# Patient Record
Sex: FEMALE | Race: OTHER | Employment: UNEMPLOYED | URBAN - METROPOLITAN AREA
[De-identification: names, ages, dates, MRNs, and addresses within clinical notes are randomized per-mention and may not be internally consistent; named-entity substitution may affect disease eponyms.]

---

## 2019-10-16 ENCOUNTER — IMMUNIZATION (OUTPATIENT)
Dept: FAMILY MEDICINE CLINIC | Facility: CLINIC | Age: 24
End: 2019-10-16
Payer: MEDICAID

## 2019-10-16 DIAGNOSIS — Z23 NEED FOR VACCINATION: ICD-10-CM

## 2019-10-16 PROCEDURE — 90686 IIV4 VACC NO PRSV 0.5 ML IM: CPT | Performed by: NURSE PRACTITIONER

## 2019-10-16 PROCEDURE — 90471 IMMUNIZATION ADMIN: CPT | Performed by: NURSE PRACTITIONER

## 2020-02-03 ENCOUNTER — HOSPITAL ENCOUNTER (EMERGENCY)
Facility: HOSPITAL | Age: 25
Discharge: HOME | End: 2020-02-03
Attending: EMERGENCY MEDICINE
Payer: MEDICAID

## 2020-02-03 ENCOUNTER — APPOINTMENT (OUTPATIENT)
Dept: ULTRASOUND IMAGING | Facility: HOSPITAL | Age: 25
End: 2020-02-03
Attending: EMERGENCY MEDICINE
Payer: MEDICAID

## 2020-02-03 VITALS
HEIGHT: 64 IN | HEART RATE: 78 BPM | TEMPERATURE: 98 F | BODY MASS INDEX: 34.15 KG/M2 | DIASTOLIC BLOOD PRESSURE: 66 MMHG | WEIGHT: 200 LBS | RESPIRATION RATE: 18 BRPM | OXYGEN SATURATION: 99 % | SYSTOLIC BLOOD PRESSURE: 130 MMHG

## 2020-02-03 DIAGNOSIS — O26.891 ABDOMINAL PAIN DURING PREGNANCY IN FIRST TRIMESTER: Primary | ICD-10-CM

## 2020-02-03 DIAGNOSIS — R10.9 ABDOMINAL PAIN DURING PREGNANCY IN FIRST TRIMESTER: Primary | ICD-10-CM

## 2020-02-03 LAB
ANION GAP SERPL CALC-SCNC: 8 MMOL/L (ref 0–18)
ANTIBODY SCREEN: NEGATIVE
B-HCG SERPL-ACNC: 2397 MIU/ML
B-HCG UR QL: POSITIVE
BACTERIA UR QL AUTO: NEGATIVE /HPF
BASOPHILS # BLD AUTO: 0.04 X10(3) UL (ref 0–0.2)
BASOPHILS NFR BLD AUTO: 0.4 %
BILIRUB UR QL: NEGATIVE
BUN BLD-MCNC: 7 MG/DL (ref 7–18)
BUN/CREAT SERPL: 13 (ref 10–20)
CALCIUM BLD-MCNC: 8.4 MG/DL (ref 8.5–10.1)
CHLORIDE SERPL-SCNC: 108 MMOL/L (ref 98–112)
CLARITY UR: CLEAR
CO2 SERPL-SCNC: 23 MMOL/L (ref 21–32)
COLOR UR: YELLOW
CREAT BLD-MCNC: 0.54 MG/DL (ref 0.55–1.02)
DEPRECATED RDW RBC AUTO: 42.6 FL (ref 35.1–46.3)
EOSINOPHIL # BLD AUTO: 0.28 X10(3) UL (ref 0–0.7)
EOSINOPHIL NFR BLD AUTO: 2.6 %
ERYTHROCYTE [DISTWIDTH] IN BLOOD BY AUTOMATED COUNT: 13 % (ref 11–15)
GLUCOSE BLD-MCNC: 105 MG/DL (ref 70–99)
GLUCOSE UR-MCNC: NEGATIVE MG/DL
HCT VFR BLD AUTO: 37.9 % (ref 35–48)
HGB BLD-MCNC: 12.6 G/DL (ref 12–16)
HGB UR QL STRIP.AUTO: NEGATIVE
IMM GRANULOCYTES # BLD AUTO: 0.04 X10(3) UL (ref 0–1)
IMM GRANULOCYTES NFR BLD: 0.4 %
KETONES UR-MCNC: 20 MG/DL
LYMPHOCYTES # BLD AUTO: 3.15 X10(3) UL (ref 1–4)
LYMPHOCYTES NFR BLD AUTO: 29.6 %
MCH RBC QN AUTO: 29.7 PG (ref 26–34)
MCHC RBC AUTO-ENTMCNC: 33.2 G/DL (ref 31–37)
MCV RBC AUTO: 89.4 FL (ref 80–100)
MONOCYTES # BLD AUTO: 0.93 X10(3) UL (ref 0.1–1)
MONOCYTES NFR BLD AUTO: 8.7 %
NEUTROPHILS # BLD AUTO: 6.19 X10 (3) UL (ref 1.5–7.7)
NEUTROPHILS # BLD AUTO: 6.19 X10(3) UL (ref 1.5–7.7)
NEUTROPHILS NFR BLD AUTO: 58.3 %
NITRITE UR QL STRIP.AUTO: NEGATIVE
OSMOLALITY SERPL CALC.SUM OF ELEC: 286 MOSM/KG (ref 275–295)
PH UR: 5 [PH] (ref 5–8)
PLATELET # BLD AUTO: 308 10(3)UL (ref 150–450)
POTASSIUM SERPL-SCNC: 4 MMOL/L (ref 3.5–5.1)
PROT UR-MCNC: NEGATIVE MG/DL
RBC # BLD AUTO: 4.24 X10(6)UL (ref 3.8–5.3)
RBC #/AREA URNS AUTO: 1 /HPF
RH BLOOD TYPE: POSITIVE
SODIUM SERPL-SCNC: 139 MMOL/L (ref 136–145)
SP GR UR STRIP: 1.02 (ref 1–1.03)
UROBILINOGEN UR STRIP-ACNC: <2
WBC # BLD AUTO: 10.6 X10(3) UL (ref 4–11)
WBC #/AREA URNS AUTO: 4 /HPF

## 2020-02-03 PROCEDURE — 85025 COMPLETE CBC W/AUTO DIFF WBC: CPT | Performed by: EMERGENCY MEDICINE

## 2020-02-03 PROCEDURE — 99285 EMERGENCY DEPT VISIT HI MDM: CPT

## 2020-02-03 PROCEDURE — 81001 URINALYSIS AUTO W/SCOPE: CPT | Performed by: EMERGENCY MEDICINE

## 2020-02-03 PROCEDURE — 84702 CHORIONIC GONADOTROPIN TEST: CPT | Performed by: EMERGENCY MEDICINE

## 2020-02-03 PROCEDURE — 81025 URINE PREGNANCY TEST: CPT

## 2020-02-03 PROCEDURE — 86850 RBC ANTIBODY SCREEN: CPT | Performed by: EMERGENCY MEDICINE

## 2020-02-03 PROCEDURE — 76817 TRANSVAGINAL US OBSTETRIC: CPT | Performed by: EMERGENCY MEDICINE

## 2020-02-03 PROCEDURE — 86900 BLOOD TYPING SEROLOGIC ABO: CPT | Performed by: EMERGENCY MEDICINE

## 2020-02-03 PROCEDURE — 86901 BLOOD TYPING SEROLOGIC RH(D): CPT | Performed by: EMERGENCY MEDICINE

## 2020-02-03 PROCEDURE — 36415 COLL VENOUS BLD VENIPUNCTURE: CPT

## 2020-02-03 PROCEDURE — 80048 BASIC METABOLIC PNL TOTAL CA: CPT | Performed by: EMERGENCY MEDICINE

## 2020-02-03 PROCEDURE — 76801 OB US < 14 WKS SINGLE FETUS: CPT | Performed by: EMERGENCY MEDICINE

## 2020-02-04 NOTE — ED PROVIDER NOTES
Patient Seen in: [facility] AND [facility] Emergency Department    History   Patient presents with:  Abdomen/Flank Pain      HPI    Patient presents to the ED complaining of low abdominal pain for the past week. Progressive and now moderate in severity.   Locate Tenderness to the left lower quadrant, no rebound or guarding Musculoskeletal:         General: No deformity or edema. Neurological: She is alert and oriented to person, place, and time. Skin: Skin is warm and dry.    Psychiatric: She has a normal m 82 Charo Monterroso (BLOOD [facility] [place] [facility])[[id number]]                               Final result                               ANTIBODY SCREEN[[id number]]                                  Final result                                                 Pl Temp: 98.1 °F (36.7 °C)     TempSrc: Temporal     SpO2: 99% 99% 99%   Weight: 90.7 kg     Height: 162.6 cm (5' 4\")       *I personally reviewed and interpreted all ED vitals.     Pulse Ox: 99%, Room air, Normal     Differential Diagnosis/ Diagnostic Cons

## 2020-02-04 NOTE — ED INITIAL ASSESSMENT (HPI)
Pt ambulatory to triage for complaints of lower abdominal pain with nausea that has been going x 1 week. Pt denies vomiting, denies fever, denies vaginal bleeding. Pt was at Urgent care today and has POSITIVE PREGNANCY TEST. LMP: December 20,2019.   Per

## 2020-02-11 ENCOUNTER — OFFICE VISIT (OUTPATIENT)
Dept: OBGYN CLINIC | Facility: CLINIC | Age: 25
End: 2020-02-11
Payer: MEDICAID

## 2020-02-11 ENCOUNTER — TELEPHONE (OUTPATIENT)
Dept: OBGYN CLINIC | Facility: CLINIC | Age: 25
End: 2020-02-11

## 2020-02-11 VITALS
HEIGHT: 64 IN | BODY MASS INDEX: 35.96 KG/M2 | DIASTOLIC BLOOD PRESSURE: 70 MMHG | SYSTOLIC BLOOD PRESSURE: 112 MMHG | WEIGHT: 210.63 LBS

## 2020-02-11 DIAGNOSIS — N92.6 MISSED MENSES: Primary | ICD-10-CM

## 2020-02-11 LAB
CONTROL LINE PRESENT WITH A CLEAR BACKGROUND (YES/NO): YES YES/NO
KIT LOT #: NORMAL NUMERIC

## 2020-02-11 PROCEDURE — 81025 URINE PREGNANCY TEST: CPT | Performed by: OBSTETRICS & GYNECOLOGY

## 2020-02-11 PROCEDURE — 99203 OFFICE O/P NEW LOW 30 MIN: CPT | Performed by: OBSTETRICS & GYNECOLOGY

## 2020-02-11 RX ORDER — VITAMIN A ACETATE, BETA CAROTENE, ASCORBIC ACID, CHOLECALCIFEROL, .ALPHA.-TOCOPHEROL ACETATE, DL-, THIAMINE MONONITRATE, RIBOFLAVIN, NIACINAMIDE, PYRIDOXINE HYDROCHLORIDE, FOLIC ACID, CYANOCOBALAMIN, CALCIUM CARBONATE, FERROUS FUMARATE, ZINC OXIDE, CUPRIC OXIDE 3080; 12; 120; 400; 1; 1.84; 3; 20; 22; 920; 25; 200; 27; 10; 2 [IU]/1; UG/1; MG/1; [IU]/1; MG/1; MG/1; MG/1; MG/1; MG/1; [IU]/1; MG/1; MG/1; MG/1; MG/1; MG/1
1 TABLET, FILM COATED ORAL DAILY
Qty: 90 TABLET | Refills: 3 | Status: SHIPPED | OUTPATIENT
Start: 2020-02-11 | End: 2020-03-12

## 2020-02-11 RX ORDER — PNV 119/IRON FUM/FOLIC ACID 29 MG-1 MG
1 TABLET ORAL DAILY
Qty: 30 TABLET | Refills: 11 | Status: SHIPPED | OUTPATIENT
Start: 2020-02-11

## 2020-02-11 NOTE — PROGRESS NOTES
HPI:    Patient ID: Onesimo Painter is a 27year old female. HPI  New patient  Missed menses visit  I delivered this patient 22 years ago! She is a  2 para 1 with a last menstrual period of  who conceived on Depo-Provera.   She had her last

## 2020-02-11 NOTE — TELEPHONE ENCOUNTER
Emirati SPEAKER- Pt would like to know if Dr. Rosalio Perkins is going to prescribe prenatal vitamins.  Please advise

## 2020-02-20 ENCOUNTER — OFFICE VISIT (OUTPATIENT)
Dept: OBGYN CLINIC | Facility: CLINIC | Age: 25
End: 2020-02-20
Payer: MEDICAID

## 2020-02-20 VITALS
SYSTOLIC BLOOD PRESSURE: 114 MMHG | BODY MASS INDEX: 35.85 KG/M2 | DIASTOLIC BLOOD PRESSURE: 70 MMHG | HEIGHT: 64 IN | WEIGHT: 210 LBS

## 2020-02-20 DIAGNOSIS — N92.6 MISSED MENSES: Primary | ICD-10-CM

## 2020-02-20 PROCEDURE — 99212 OFFICE O/P EST SF 10 MIN: CPT | Performed by: OBSTETRICS & GYNECOLOGY

## 2020-02-20 PROCEDURE — 76817 TRANSVAGINAL US OBSTETRIC: CPT | Performed by: OBSTETRICS & GYNECOLOGY

## 2020-02-20 NOTE — PROGRESS NOTES
HPI:    Patient ID: Ramone Rosales is a 27year old female. HPI  Follow-up visit  Ultrasound for dating done transvaginally showing a single live intrauterine gestation at 7-2/7 weeks gestational age. EDC of October 6, 2020.   Patient states that she was [facility]

## 2020-03-06 ENCOUNTER — TELEPHONE (OUTPATIENT)
Dept: OBGYN CLINIC | Facility: CLINIC | Age: 25
End: 2020-03-06

## 2020-03-06 NOTE — TELEPHONE ENCOUNTER
Pt Name and  verified. Pt states that she needs script to sent again to pharmacy as her insurance was no covering the PNV. Advised pt that script was sent again for a similar PNV and she needs to check with the Pharmacy to see if this one is covered.

## 2020-03-17 ENCOUNTER — NURSE ONLY (OUTPATIENT)
Dept: OBGYN CLINIC | Facility: CLINIC | Age: 25
End: 2020-03-17
Payer: MEDICAID

## 2020-03-17 ENCOUNTER — TELEPHONE (OUTPATIENT)
Dept: OBGYN CLINIC | Facility: CLINIC | Age: 25
End: 2020-03-17

## 2020-03-17 DIAGNOSIS — Z34.81 ENCOUNTER FOR SUPERVISION OF OTHER NORMAL PREGNANCY IN FIRST TRIMESTER: Primary | ICD-10-CM

## 2020-03-17 DIAGNOSIS — O24.011 PRE-EXISTING TYPE 1 DIABETES MELLITUS DURING PREGNANCY IN FIRST TRIMESTER: Primary | ICD-10-CM

## 2020-03-17 DIAGNOSIS — O24.011 PRE-EXISTING TYPE 1 DIABETES MELLITUS DURING PREGNANCY IN FIRST TRIMESTER: ICD-10-CM

## 2020-03-17 PROCEDURE — 99211 OFF/OP EST MAY X REQ PHY/QHP: CPT | Performed by: OBSTETRICS & GYNECOLOGY

## 2020-03-17 NOTE — TELEPHONE ENCOUNTER
Had over the phone Ob Nurse education with patient. Currently 11 weeks gestation, was Dx with diabetes 01/2020 and currently on Metformin, Checking blood sugars BID. Hasn't seen diabetes center.  Ok to send referral for diabetes nutrition and glucose anam

## 2020-03-17 NOTE — PROGRESS NOTES
Pt is here today for RN [employer] Education.     Missed menses apt with: ROWAN  LMP:2019    Pre  BMI: 33.97  EPDS score: 1    US: Office U/S   Working YESI: 10/06/2020  Hx of genetic abnormality in family: None  Hx of varicella: unknown  Hx of diabet

## 2020-03-25 ENCOUNTER — LAB ENCOUNTER (OUTPATIENT)
Dept: LAB | Facility: HOSPITAL | Age: 25
End: 2020-03-25
Attending: OBSTETRICS & GYNECOLOGY
Payer: MEDICAID

## 2020-03-25 ENCOUNTER — TELEPHONE (OUTPATIENT)
Dept: OBGYN CLINIC | Facility: CLINIC | Age: 25
End: 2020-03-25

## 2020-03-25 DIAGNOSIS — Z86.32 HISTORY OF GESTATIONAL DIABETES: ICD-10-CM

## 2020-03-25 DIAGNOSIS — Z86.32 HISTORY OF GESTATIONAL DIABETES: Primary | ICD-10-CM

## 2020-03-25 DIAGNOSIS — Z34.81 ENCOUNTER FOR SUPERVISION OF OTHER NORMAL PREGNANCY IN FIRST TRIMESTER: ICD-10-CM

## 2020-03-25 DIAGNOSIS — O24.011 PRE-EXISTING TYPE 1 DIABETES MELLITUS DURING PREGNANCY IN FIRST TRIMESTER: ICD-10-CM

## 2020-03-25 LAB
ALBUMIN SERPL-MCNC: 3.3 G/DL (ref 3.4–5)
ALBUMIN/GLOB SERPL: 0.8 {RATIO} (ref 1–2)
ALP LIVER SERPL-CCNC: 58 U/L (ref 52–144)
ALT SERPL-CCNC: 16 U/L (ref 13–56)
ANION GAP SERPL CALC-SCNC: 7 MMOL/L (ref 0–18)
ANTIBODY SCREEN: NEGATIVE
AST SERPL-CCNC: 14 U/L (ref 15–37)
BASOPHILS # BLD AUTO: 0.05 X10(3) UL (ref 0–0.2)
BASOPHILS NFR BLD AUTO: 0.4 %
BILIRUB SERPL-MCNC: 0.2 MG/DL (ref 0.1–2)
BUN BLD-MCNC: 7 MG/DL (ref 7–18)
BUN/CREAT SERPL: 15.9 (ref 10–20)
CALCIUM BLD-MCNC: 9.2 MG/DL (ref 8.5–10.1)
CHLORIDE SERPL-SCNC: 107 MMOL/L (ref 98–112)
CO2 SERPL-SCNC: 23 MMOL/L (ref 21–32)
CREAT BLD-MCNC: 0.44 MG/DL (ref 0.55–1.02)
DEPRECATED RDW RBC AUTO: 42.4 FL (ref 35.1–46.3)
EOSINOPHIL # BLD AUTO: 0.27 X10(3) UL (ref 0–0.7)
EOSINOPHIL NFR BLD AUTO: 2.4 %
ERYTHROCYTE [DISTWIDTH] IN BLOOD BY AUTOMATED COUNT: 13.1 % (ref 11–15)
EST. AVERAGE GLUCOSE BLD GHB EST-MCNC: 126 MG/DL (ref 68–126)
GLOBULIN PLAS-MCNC: 3.9 G/DL (ref 2.8–4.4)
GLUCOSE 1H P GLC SERPL-MCNC: 151 MG/DL
GLUCOSE BLD-MCNC: 95 MG/DL (ref 70–99)
HBA1C MFR BLD HPLC: 6 % (ref ?–5.7)
HBV SURFACE AG SER-ACNC: 0.1 [IU]/L
HBV SURFACE AG SERPL QL IA: NONREACTIVE
HCT VFR BLD AUTO: 39.8 % (ref 35–48)
HGB BLD-MCNC: 13.4 G/DL (ref 12–16)
IMM GRANULOCYTES # BLD AUTO: 0.08 X10(3) UL (ref 0–1)
IMM GRANULOCYTES NFR BLD: 0.7 %
LYMPHOCYTES # BLD AUTO: 2.53 X10(3) UL (ref 1–4)
LYMPHOCYTES NFR BLD AUTO: 22.1 %
M PROTEIN MFR SERPL ELPH: 7.2 G/DL (ref 6.4–8.2)
MCH RBC QN AUTO: 29.8 PG (ref 26–34)
MCHC RBC AUTO-ENTMCNC: 33.7 G/DL (ref 31–37)
MCV RBC AUTO: 88.6 FL (ref 80–100)
MONOCYTES # BLD AUTO: 0.88 X10(3) UL (ref 0.1–1)
MONOCYTES NFR BLD AUTO: 7.7 %
NEUTROPHILS # BLD AUTO: 7.66 X10 (3) UL (ref 1.5–7.7)
NEUTROPHILS # BLD AUTO: 7.66 X10(3) UL (ref 1.5–7.7)
NEUTROPHILS NFR BLD AUTO: 66.7 %
OSMOLALITY SERPL CALC.SUM OF ELEC: 282 MOSM/KG (ref 275–295)
PATIENT FASTING Y/N/NP: NO
PLATELET # BLD AUTO: 301 10(3)UL (ref 150–450)
POTASSIUM SERPL-SCNC: 4 MMOL/L (ref 3.5–5.1)
RBC # BLD AUTO: 4.49 X10(6)UL (ref 3.8–5.3)
RUBV IGG SER QL: POSITIVE
RUBV IGG SER-ACNC: 130 IU/ML (ref 10–?)
SODIUM SERPL-SCNC: 137 MMOL/L (ref 136–145)
WBC # BLD AUTO: 11.5 X10(3) UL (ref 4–11)

## 2020-03-25 PROCEDURE — 83036 HEMOGLOBIN GLYCOSYLATED A1C: CPT

## 2020-03-25 PROCEDURE — 82950 GLUCOSE TEST: CPT

## 2020-03-25 PROCEDURE — 87086 URINE CULTURE/COLONY COUNT: CPT

## 2020-03-25 PROCEDURE — 86780 TREPONEMA PALLIDUM: CPT

## 2020-03-25 PROCEDURE — 85025 COMPLETE CBC W/AUTO DIFF WBC: CPT

## 2020-03-25 PROCEDURE — 36415 COLL VENOUS BLD VENIPUNCTURE: CPT

## 2020-03-25 PROCEDURE — 87340 HEPATITIS B SURFACE AG IA: CPT

## 2020-03-25 PROCEDURE — 87389 HIV-1 AG W/HIV-1&-2 AB AG IA: CPT

## 2020-03-25 PROCEDURE — 86762 RUBELLA ANTIBODY: CPT

## 2020-03-25 PROCEDURE — 80053 COMPREHEN METABOLIC PANEL: CPT

## 2020-03-25 PROCEDURE — 86850 RBC ANTIBODY SCREEN: CPT

## 2020-03-26 ENCOUNTER — TELEPHONE (OUTPATIENT)
Dept: OBGYN CLINIC | Facility: CLINIC | Age: 25
End: 2020-03-26

## 2020-03-26 DIAGNOSIS — R73.09 ELEVATED GLUCOSE TOLERANCE TEST: Primary | ICD-10-CM

## 2020-03-26 PROBLEM — E11.9 TYPE 2 DIABETES MELLITUS WITHOUT COMPLICATION (HCC): Status: ACTIVE | Noted: 2020-03-26

## 2020-03-26 NOTE — TELEPHONE ENCOUNTER
03/26/2020 Pt was informed about her glucose and A1C results. Pt verbalized understanding. Pt did not have any further questions. Diabetes Center order has been placed in [place].

## 2020-03-26 NOTE — TELEPHONE ENCOUNTER
----- Message from Autumn Thomas MD sent at 3/26/2020  9:04 AM CDT -----  Please inform Glucola and Hgb A1C elevated. Has diagnosis of diabetes already. Will defer 3 hr GTT.   Patient to go to Diabetes center for dietary counseling and glucose monitori

## 2020-03-27 ENCOUNTER — INITIAL PRENATAL (OUTPATIENT)
Dept: OBGYN CLINIC | Facility: CLINIC | Age: 25
End: 2020-03-27
Payer: MEDICAID

## 2020-03-27 VITALS — DIASTOLIC BLOOD PRESSURE: 74 MMHG | WEIGHT: 201 LBS | SYSTOLIC BLOOD PRESSURE: 122 MMHG | BODY MASS INDEX: 35 KG/M2

## 2020-03-27 DIAGNOSIS — O24.111 PRE-EXISTING TYPE 2 DIABETES MELLITUS DURING PREGNANCY IN FIRST TRIMESTER: ICD-10-CM

## 2020-03-27 DIAGNOSIS — Z34.91 NORMAL PREGNANCY IN FIRST TRIMESTER: Primary | ICD-10-CM

## 2020-03-27 PROBLEM — O99.210 OBESITY AFFECTING PREGNANCY, ANTEPARTUM: Status: ACTIVE | Noted: 2020-03-27

## 2020-03-27 PROBLEM — N92.6 MISSED MENSES: Status: RESOLVED | Noted: 2020-02-11 | Resolved: 2020-03-27

## 2020-03-27 PROBLEM — O99.210 OBESITY AFFECTING PREGNANCY, ANTEPARTUM (HCC): Status: ACTIVE | Noted: 2020-03-27

## 2020-03-27 LAB
APPEARANCE: CLEAR
MULTISTIX LOT#: NORMAL NUMERIC
PH, URINE: 6 (ref 4.5–8)
SPECIFIC GRAVITY: 1.02 (ref 1–1.03)
T PALLIDUM AB SER QL: NEGATIVE
UROBILINOGEN,SEMI-QN: 0.2 MG/DL (ref 0–1.9)

## 2020-03-27 PROCEDURE — 81002 URINALYSIS NONAUTO W/O SCOPE: CPT | Performed by: OBSTETRICS & GYNECOLOGY

## 2020-03-27 PROCEDURE — 99213 OFFICE O/P EST LOW 20 MIN: CPT | Performed by: OBSTETRICS & GYNECOLOGY

## 2020-03-27 NOTE — PROGRESS NOTES
No c/o. Has not done genetic screen  Order FTS and MFM cons for DM  Has DM center appt for tomorrow.

## 2020-03-28 ENCOUNTER — HOSPITAL ENCOUNTER (OUTPATIENT)
Dept: ENDOCRINOLOGY | Facility: HOSPITAL | Age: 25
Discharge: HOME | End: 2020-03-28
Attending: OBSTETRICS & GYNECOLOGY
Payer: MEDICAID

## 2020-03-28 VITALS — WEIGHT: 210 LBS | BODY MASS INDEX: 36 KG/M2

## 2020-03-28 DIAGNOSIS — O24.112 TYPE 2 DIABETES MELLITUS IN PREGNANCY, SECOND TRIMESTER: Primary | ICD-10-CM

## 2020-03-28 RX ORDER — LANCETS 33 GAUGE
1 EACH MISCELLANEOUS 4 TIMES DAILY
Qty: 1 BOX | Refills: 2 | Status: SHIPPED | OUTPATIENT
Start: 2020-03-28 | End: 2021-05-22

## 2020-03-28 NOTE — TELEPHONE ENCOUNTER
Received fax from Anthoyn Hay about prior authorization for pt's BG strips for her ONeTouch Ultra meter. Called and spoke to pharmacist who states that she was able to put through insurance without a prior auth for pt to do 3x per day testing.   Should was t

## 2020-03-30 ENCOUNTER — TELEPHONE (OUTPATIENT)
Dept: OBGYN CLINIC | Facility: CLINIC | Age: 25
End: 2020-03-30

## 2020-03-30 LAB
C TRACH DNA SPEC QL NAA+PROBE: NEGATIVE
N GONORRHOEA DNA SPEC QL NAA+PROBE: NEGATIVE
PAP HISTORY (OTHER THAN LAST PAP): NORMAL

## 2020-03-30 NOTE — TELEPHONE ENCOUNTER
Your case has been sent to Medical Review.      Insurance Carrier: [facility]  Site Name: [facility] Site ID: [id number]        Primary Diagnosis Code: L96.732 Description: Pre-existing type 2 diabetes mellitus, in pregnancy, first trimester  [facility]

## 2020-03-31 NOTE — TELEPHONE ENCOUNTER
Request ID: [id number]  Physician: DR. Marty Mcguire  Facility/Provider: [facility]  Treatment Setting: Outpatient  Approved  [id number]   [id number]   Begin Date: End Date:  3/30/2020 12/26/2020

## 2020-04-06 ENCOUNTER — TELEPHONE (OUTPATIENT)
Dept: ENDOCRINOLOGY | Facility: HOSPITAL | Age: 25
End: 2020-04-06

## 2020-04-06 ENCOUNTER — HOSPITAL ENCOUNTER (OUTPATIENT)
Dept: PERINATAL CARE | Facility: HOSPITAL | Age: 25
Discharge: HOME | End: 2020-04-06
Attending: OBSTETRICS & GYNECOLOGY
Payer: MEDICAID

## 2020-04-06 ENCOUNTER — TELEPHONE (OUTPATIENT)
Dept: OBGYN CLINIC | Facility: CLINIC | Age: 25
End: 2020-04-06

## 2020-04-06 DIAGNOSIS — O99.210 OBESITY AFFECTING PREGNANCY, ANTEPARTUM: ICD-10-CM

## 2020-04-06 DIAGNOSIS — O24.112 TYPE 2 DIABETES MELLITUS IN PREGNANCY, SECOND TRIMESTER: ICD-10-CM

## 2020-04-06 DIAGNOSIS — O24.319 PREGESTATIONAL DIABETES MELLITUS, MODIFIED WHITE CLASS B: Primary | ICD-10-CM

## 2020-04-06 DIAGNOSIS — O24.012 PRE-EXISTING TYPE 1 DIABETES MELLITUS DURING PREGNANCY IN SECOND TRIMESTER: Primary | ICD-10-CM

## 2020-04-06 DIAGNOSIS — E11.9 TYPE 2 DIABETES MELLITUS WITHOUT COMPLICATION (HCC): Primary | ICD-10-CM

## 2020-04-06 DIAGNOSIS — O99.210 OBESITY IN PREGNANCY: ICD-10-CM

## 2020-04-06 DIAGNOSIS — E11.9 TYPE 2 DIABETES MELLITUS WITHOUT COMPLICATION (HCC): ICD-10-CM

## 2020-04-06 PROCEDURE — 76813 OB US NUCHAL MEAS 1 GEST: CPT | Performed by: OBSTETRICS & GYNECOLOGY

## 2020-04-06 PROCEDURE — 99202 OFFICE O/P NEW SF 15 MIN: CPT | Performed by: OBSTETRICS & GYNECOLOGY

## 2020-04-06 NOTE — PROGRESS NOTES
Outpatient Maternal-Fetal Medicine Consultation    Dear Dr. Morgan Hartley    Thank you for requesting ultrasound evaluation and maternal fetal medicine consultation on your patient Janel Hdez.   As you are aware she is a 27year old female  with a lopez non-tender  Extremities: non-tender, no edema    OBSTETRIC ULTRASOUND  The patient had a first trimester ultrasound today which revealed normal first trimester anatomy with size consistent with dates.   The NT measurement was: 1.2 mm; this is within normal incidence of LGA infants; it also changes the anthropometric measurements of infants of diabetic mothers (IDMs) compared with offspring of women without diabetes. Specifically, the chest-to-head and shoulder-to-head ratios are increased in IDMs.   LGA fetus primarily due to achievement of good glycemic control.  The fetus of the diabetic mother is at risk for hypoxia primarily from two mechanisms: (1) fetal hyperglycemia and hyperinsulinemia increase fetal oxygen consumption, which may induce fetal hypoxemia a for protein and creatinine clearance, as well as serum labs including a complete metabolic profile, CBC and HgB A1C. Fetal Evaluation:  Fetal testing was also reviewed with the patient.   First trimester dating and early anatomic assessment is advised as consultation and coordination of care. Our discussion is summarized above. The approximate physician face-to-face time was 30 minutes.

## 2020-04-06 NOTE — TELEPHONE ENCOUNTER
Orders placed for MFM. Reviewed plan with patient and advised to provide weekly blood sugar results to MFM. Verbalized understanding and agreement.

## 2020-04-06 NOTE — TELEPHONE ENCOUNTER
Telephone Encounter for GDM Follow Up    Referring Provider: Nikhil    Time Spent: 20 min     Assessment:   Still on Metformin   3 weeks until next appt       Meter ; from memory:  Blood Glucose: FB-85-90 estimate PP: B: 120-125-140 (x 2 of 4 days directed. 3. Bring glucose log to each MD visit. 4. Start/continue activity if no restrictions.         Sent new meter to pharmacy; told her to contact MD office with actual numbers (as she only guesstimated on numbers today - didn't have sheets and me

## 2020-04-06 NOTE — TELEPHONE ENCOUNTER
Please see that patient follows through with recommended from MFM:    IMPRESSION:  · IUP at 13w6d  · First Trimester Screening: normal NT  · Pre-gestational Diabetes     RECOMMENDATIONS:  · Continue care with Dr. Finn Jones  · Follow-up with FTS results when th

## 2020-04-09 ENCOUNTER — TELEPHONE (OUTPATIENT)
Dept: OBGYN CLINIC | Facility: CLINIC | Age: 25
End: 2020-04-09

## 2020-04-09 NOTE — TELEPHONE ENCOUNTER
Pt Name and  verified. Pt states she received a message that her results were in received and being processed on 2020. Advised pt that it usually takes 7 days to receive results. Will call pt once results received. Pt voiced understanding.

## 2020-04-11 NOTE — TELEPHONE ENCOUNTER
This Nurse logged in to the [place] web site to see update on pt's lab and it is still processing. Will recheck Monday.

## 2020-04-13 ENCOUNTER — TELEPHONE (OUTPATIENT)
Dept: ENDOCRINOLOGY | Facility: HOSPITAL | Age: 25
End: 2020-04-13

## 2020-04-13 NOTE — TELEPHONE ENCOUNTER
Called to follow up on diabetes in pregnancy; will await return phone call. Left hours of work and phone number for return phone call.

## 2020-04-13 NOTE — TELEPHONE ENCOUNTER
Innatal test received    Sample Collected date: 04/03/2020  Sample Reported date: 04/12/2020      Results: low risk   Chromosome 21,18, 13: no aneuploidy   Fetal Sex: female       Placed in  [facility] desk for review and he will sign off on it tomorrow.  Per asj

## 2020-04-13 NOTE — TELEPHONE ENCOUNTER
Pt Name and  verified. Pt informed that results were received via Fax and placed on ASJ's desk for review. Informed that once reviewed a call back would be made. Pt voiced understanding.

## 2020-04-18 ENCOUNTER — APPOINTMENT (OUTPATIENT)
Dept: ENDOCRINOLOGY | Facility: HOSPITAL | Age: 25
End: 2020-04-18
Attending: OBSTETRICS & GYNECOLOGY

## 2020-04-29 ENCOUNTER — ROUTINE PRENATAL (OUTPATIENT)
Dept: OBGYN CLINIC | Facility: CLINIC | Age: 25
End: 2020-04-29
Payer: MEDICAID

## 2020-04-29 VITALS — SYSTOLIC BLOOD PRESSURE: 108 MMHG | WEIGHT: 208 LBS | DIASTOLIC BLOOD PRESSURE: 66 MMHG | BODY MASS INDEX: 36 KG/M2

## 2020-04-29 DIAGNOSIS — Z34.82 ENCOUNTER FOR SUPERVISION OF OTHER NORMAL PREGNANCY IN SECOND TRIMESTER: Primary | ICD-10-CM

## 2020-04-29 PROCEDURE — 0502F SUBSEQUENT PRENATAL CARE: CPT | Performed by: OBSTETRICS & GYNECOLOGY

## 2020-04-29 PROCEDURE — 81002 URINALYSIS NONAUTO W/O SCOPE: CPT | Performed by: OBSTETRICS & GYNECOLOGY

## 2020-04-29 NOTE — PROGRESS NOTES
Patient states her accucheck machine broke and she just obtained a new one. To Continue Metformin. To do MSAFP today. Has Level 2 U/S scheduled in 3 weeks.

## 2020-05-04 ENCOUNTER — TELEPHONE (OUTPATIENT)
Dept: OBGYN CLINIC | Facility: CLINIC | Age: 25
End: 2020-05-04

## 2020-05-06 NOTE — TELEPHONE ENCOUNTER
Primary Diagnosis Code: O24.012 Description: Pre-existing type 1 diabetes mellitus, in pregnancy, second trimester  Secondary Diagnosis Code:  Description:   Date of Service: Not provided    CPT Code: 56157 OBUS Description: OB Ultrasound  Authorization Nu

## 2020-05-18 NOTE — PROGRESS NOTES
Glen Carl    Dear Dr. Kelly Lo    Thank you for requesting an ultrasound and maternal-fetal medicine consultation on your patient Dio Montalvo.   As you are aware she is a 23year old female  with a lopez pregnancy a M.D.    The majority of the time (>50%) was spent in review of records, consultation and coordination of care. Our discussion is summarized above. The approximate physician face-to-face time was 40 minutes.

## 2020-05-19 ENCOUNTER — HOSPITAL ENCOUNTER (OUTPATIENT)
Dept: PERINATAL CARE | Facility: HOSPITAL | Age: 25
Discharge: HOME | End: 2020-05-19
Attending: OBSTETRICS & GYNECOLOGY
Payer: MEDICAID

## 2020-05-19 ENCOUNTER — TELEPHONE (OUTPATIENT)
Dept: PERINATAL CARE | Facility: HOSPITAL | Age: 25
End: 2020-05-19

## 2020-05-19 VITALS
HEART RATE: 112 BPM | BODY MASS INDEX: 36 KG/M2 | WEIGHT: 211 LBS | SYSTOLIC BLOOD PRESSURE: 117 MMHG | DIASTOLIC BLOOD PRESSURE: 72 MMHG

## 2020-05-19 DIAGNOSIS — O99.210 OBESITY AFFECTING PREGNANCY, ANTEPARTUM: ICD-10-CM

## 2020-05-19 DIAGNOSIS — E11.9 TYPE 2 DIABETES MELLITUS WITHOUT COMPLICATION (HCC): Primary | ICD-10-CM

## 2020-05-19 DIAGNOSIS — E11.9 TYPE 2 DIABETES MELLITUS WITHOUT COMPLICATION (HCC): ICD-10-CM

## 2020-05-19 DIAGNOSIS — O24.112 TYPE 2 DIABETES MELLITUS IN PREGNANCY, SECOND TRIMESTER: ICD-10-CM

## 2020-05-19 PROCEDURE — 76811 OB US DETAILED SNGL FETUS: CPT | Performed by: OBSTETRICS & GYNECOLOGY

## 2020-05-19 PROCEDURE — 99215 OFFICE O/P EST HI 40 MIN: CPT | Performed by: OBSTETRICS & GYNECOLOGY

## 2020-05-19 NOTE — TELEPHONE ENCOUNTER
STAFF MESSAGE SENT REQUESTING PRIOR AUTHORIZATION FOR PATIENT SCHEDULED 6/17/20 AND FOR THE RECOMMENDED MONTHLY GROWTHS IN THIRD TRIMESTER.

## 2020-05-27 ENCOUNTER — TELEPHONE (OUTPATIENT)
Dept: OBGYN CLINIC | Facility: CLINIC | Age: 25
End: 2020-05-27

## 2020-05-27 NOTE — TELEPHONE ENCOUNTER
Pt Name and  verified. Pt informed of providing MFM glucose numbers on a weekly basis. Pt voiced understanding and has scheduled an apt for next week in [facility] at 4:20.

## 2020-05-27 NOTE — TELEPHONE ENCOUNTER
Gerardo Cali needs to make a prenatal appt with us. She has not been seen since 4/29. Also, we would like her to provide her blood sugar values to the [facility] office on a weekly basis to monitor her blood sugar.

## 2020-05-29 ENCOUNTER — TELEPHONE (OUTPATIENT)
Dept: OBGYN CLINIC | Facility: CLINIC | Age: 25
End: 2020-05-29

## 2020-05-30 NOTE — TELEPHONE ENCOUNTER
Pt Name and  verified. Pt informed of need to complete test and pt voiced understanding. Will complete in [facility] lab on Monday.

## 2020-06-01 ENCOUNTER — APPOINTMENT (OUTPATIENT)
Dept: LAB | Facility: HOSPITAL | Age: 25
End: 2020-06-01
Attending: OBSTETRICS & GYNECOLOGY
Payer: MEDICAID

## 2020-06-01 ENCOUNTER — ROUTINE PRENATAL (OUTPATIENT)
Dept: OBGYN CLINIC | Facility: CLINIC | Age: 25
End: 2020-06-01
Payer: MEDICAID

## 2020-06-01 VITALS
SYSTOLIC BLOOD PRESSURE: 117 MMHG | DIASTOLIC BLOOD PRESSURE: 73 MMHG | BODY MASS INDEX: 36 KG/M2 | WEIGHT: 212 LBS | HEART RATE: 91 BPM

## 2020-06-01 DIAGNOSIS — Z34.82 ENCOUNTER FOR SUPERVISION OF OTHER NORMAL PREGNANCY IN SECOND TRIMESTER: Primary | ICD-10-CM

## 2020-06-01 DIAGNOSIS — Z34.82 ENCOUNTER FOR SUPERVISION OF OTHER NORMAL PREGNANCY IN SECOND TRIMESTER: ICD-10-CM

## 2020-06-01 PROCEDURE — 82105 ALPHA-FETOPROTEIN SERUM: CPT

## 2020-06-01 PROCEDURE — 0502F SUBSEQUENT PRENATAL CARE: CPT | Performed by: OBSTETRICS & GYNECOLOGY

## 2020-06-01 PROCEDURE — 81002 URINALYSIS NONAUTO W/O SCOPE: CPT | Performed by: OBSTETRICS & GYNECOLOGY

## 2020-06-01 PROCEDURE — 36415 COLL VENOUS BLD VENIPUNCTURE: CPT

## 2020-06-02 ENCOUNTER — TELEPHONE (OUTPATIENT)
Dept: OBGYN CLINIC | Facility: CLINIC | Age: 25
End: 2020-06-02

## 2020-06-02 DIAGNOSIS — O24.112 TYPE 2 DIABETES MELLITUS AFFECTING PREGNANCY IN SECOND TRIMESTER, ANTEPARTUM: Primary | ICD-10-CM

## 2020-06-02 NOTE — TELEPHONE ENCOUNTER
----- Message from [facility] sent at 5/19/2020 11:38 AM CDT -----  Regarding: MFM PRIOR AUTHORIZATION  PATIENT SCHEDULED 6/17/20 FOR GROWTH US. RECOMMENDED SHE GET A GROWTH EVERY 4 WEEKS IN THE THIRD TRIMESTER, SO 4 OR 5 TOTAL.     [address]

## 2020-06-02 NOTE — TELEPHONE ENCOUNTER
Needs Growth US per MFM Recommendation, Order pended for MD approval pls advise:       IMPRESSION:  · IUP at 20w0d  · Pre-gestational Diabetes:  managed by OB     RECOMMENDATIONS:  · Continue care with Dr. Tejeda  · Patient already obtained cell free fetal

## 2020-06-03 NOTE — TELEPHONE ENCOUNTER
Primary Diagnosis Code: O24.112 Description: Pre-existing type 2 diabetes mellitus, in pregnancy, second trimester  Secondary Diagnosis Code:  Description:   Date of Service: Not provided    CPT Code: 61174 OBUS Description: OB Ultrasound  Case Number: [id number]

## 2020-06-05 NOTE — TELEPHONE ENCOUNTER
Authorization Number: [id number]  Case Number: [id number]     Status: Approved   P2P Status:   Approval Date: 6/4/2020 12:00:00 AM  Service Code: OBUS  Service Description: OB Ultrasound  Site Name: [facility]  Expiration Date: 3/1/2021

## 2020-06-17 ENCOUNTER — TELEPHONE (OUTPATIENT)
Dept: PERINATAL CARE | Facility: HOSPITAL | Age: 25
End: 2020-06-17

## 2020-06-17 NOTE — TELEPHONE ENCOUNTER
Per LETHA Vaughn, pt there today for fetal Echo and needs PA done for that. This RN to seek authorization for Fetal Echo.        Primary Diagnosis Code: O24.112 Description: Pre-existing type 2 diabetes mellitus, in pregnancy, second trimester  Secondary

## 2020-06-24 ENCOUNTER — TELEPHONE (OUTPATIENT)
Dept: PERINATAL CARE | Facility: HOSPITAL | Age: 25
End: 2020-06-24

## 2020-06-24 ENCOUNTER — HOSPITAL ENCOUNTER (OUTPATIENT)
Dept: PERINATAL CARE | Facility: HOSPITAL | Age: 25
Discharge: HOME | End: 2020-06-24
Attending: OBSTETRICS & GYNECOLOGY
Payer: MEDICAID

## 2020-06-24 DIAGNOSIS — O24.112 TYPE 2 DIABETES MELLITUS IN PREGNANCY, SECOND TRIMESTER: ICD-10-CM

## 2020-06-24 DIAGNOSIS — O99.210 OBESITY AFFECTING PREGNANCY, ANTEPARTUM: ICD-10-CM

## 2020-06-24 DIAGNOSIS — O99.210 OBESITY AFFECTING PREGNANCY, ANTEPARTUM: Primary | ICD-10-CM

## 2020-06-24 PROCEDURE — 76827 ECHO EXAM OF FETAL HEART: CPT | Performed by: OBSTETRICS & GYNECOLOGY

## 2020-06-24 PROCEDURE — 93325 DOPPLER ECHO COLOR FLOW MAPG: CPT | Performed by: OBSTETRICS & GYNECOLOGY

## 2020-06-24 PROCEDURE — 76825 ECHO EXAM OF FETAL HEART: CPT | Performed by: OBSTETRICS & GYNECOLOGY

## 2020-06-24 PROCEDURE — 99213 OFFICE O/P EST LOW 20 MIN: CPT | Performed by: OBSTETRICS & GYNECOLOGY

## 2020-06-24 NOTE — PROGRESS NOTES
Ally Wise    Dear Dr. Manuel Horner    Thank you for requesting an ultrasound and maternal-fetal medicine consultation on your patient Ernie Tim.   As you are aware she is a 23year old female  with a lopez pregnancy a appropriate for gestational age. No evidence of fetal arrhythmias is seen during today's study. There is a right to left shunting across the patent foramen ovale. There is a normal appearance of the aorta and branching pattern of the head vessels.

## 2020-06-30 ENCOUNTER — ROUTINE PRENATAL (OUTPATIENT)
Dept: OBGYN CLINIC | Facility: CLINIC | Age: 25
End: 2020-06-30
Payer: MEDICAID

## 2020-06-30 VITALS — WEIGHT: 212 LBS | SYSTOLIC BLOOD PRESSURE: 109 MMHG | DIASTOLIC BLOOD PRESSURE: 73 MMHG | BODY MASS INDEX: 36 KG/M2

## 2020-06-30 DIAGNOSIS — O99.210 OBESITY AFFECTING PREGNANCY, ANTEPARTUM: ICD-10-CM

## 2020-06-30 DIAGNOSIS — Z34.92 NORMAL PREGNANCY IN SECOND TRIMESTER: Primary | ICD-10-CM

## 2020-06-30 DIAGNOSIS — O24.112 TYPE 2 DIABETES MELLITUS IN PREGNANCY, SECOND TRIMESTER: ICD-10-CM

## 2020-06-30 PROCEDURE — 81002 URINALYSIS NONAUTO W/O SCOPE: CPT | Performed by: OBSTETRICS & GYNECOLOGY

## 2020-06-30 PROCEDURE — 0502F SUBSEQUENT PRENATAL CARE: CPT | Performed by: OBSTETRICS & GYNECOLOGY

## 2020-07-01 NOTE — PROGRESS NOTES
[facility]  Obstetrics and Gynecology  Prenatal Visit  Alesia Mcgarry MD    ISIDRO Tim is a 23year old.o. Danish Ortegas 26w0d weeks. Here for routine prenatal visit and is without complaints.   Patient denies any regular uterine contractions, sponta PM  6/30/2020

## 2020-07-22 ENCOUNTER — HOSPITAL ENCOUNTER (OUTPATIENT)
Dept: PERINATAL CARE | Facility: HOSPITAL | Age: 25
Discharge: HOME | End: 2020-07-22
Attending: OBSTETRICS & GYNECOLOGY
Payer: MEDICAID

## 2020-07-22 VITALS
HEART RATE: 109 BPM | BODY MASS INDEX: 37 KG/M2 | WEIGHT: 215 LBS | SYSTOLIC BLOOD PRESSURE: 113 MMHG | DIASTOLIC BLOOD PRESSURE: 67 MMHG

## 2020-07-22 DIAGNOSIS — E11.9 TYPE 2 DIABETES MELLITUS WITHOUT COMPLICATION (HCC): Primary | ICD-10-CM

## 2020-07-22 DIAGNOSIS — O99.210 OBESITY AFFECTING PREGNANCY, ANTEPARTUM: ICD-10-CM

## 2020-07-22 DIAGNOSIS — E11.9 TYPE 2 DIABETES MELLITUS WITHOUT COMPLICATION, WITHOUT LONG-TERM CURRENT USE OF INSULIN (HCC): ICD-10-CM

## 2020-07-22 DIAGNOSIS — E11.9 TYPE 2 DIABETES MELLITUS WITHOUT COMPLICATION (HCC): ICD-10-CM

## 2020-07-22 PROCEDURE — 99213 OFFICE O/P EST LOW 20 MIN: CPT | Performed by: OBSTETRICS & GYNECOLOGY

## 2020-07-22 PROCEDURE — 76816 OB US FOLLOW-UP PER FETUS: CPT | Performed by: OBSTETRICS & GYNECOLOGY

## 2020-07-22 NOTE — PROGRESS NOTES
Turner Osorio    Dear Dr. Anne-Marie Rehman    Thank you for requesting an ultrasound and maternal-fetal medicine consultation on your patient Pal Sawant.   As you are aware she is a 23year old female  with a lopez pregnancy a additional questions or concerns arise. The majority of the time (>50%) was spent in review of records, consultation and coordination of care. Our discussion is summarized above. The approximate physician face-to-face time was 20 minutes.

## 2020-08-04 ENCOUNTER — ROUTINE PRENATAL (OUTPATIENT)
Dept: OBGYN CLINIC | Facility: CLINIC | Age: 25
End: 2020-08-04
Payer: MEDICAID

## 2020-08-04 ENCOUNTER — LAB ENCOUNTER (OUTPATIENT)
Dept: LAB | Facility: HOSPITAL | Age: 25
End: 2020-08-04
Attending: OBSTETRICS & GYNECOLOGY
Payer: MEDICAID

## 2020-08-04 VITALS — SYSTOLIC BLOOD PRESSURE: 118 MMHG | DIASTOLIC BLOOD PRESSURE: 60 MMHG | WEIGHT: 216.63 LBS | BODY MASS INDEX: 37 KG/M2

## 2020-08-04 DIAGNOSIS — Z34.83 ENCOUNTER FOR SUPERVISION OF OTHER NORMAL PREGNANCY IN THIRD TRIMESTER: Primary | ICD-10-CM

## 2020-08-04 DIAGNOSIS — Z34.92 NORMAL PREGNANCY IN SECOND TRIMESTER: ICD-10-CM

## 2020-08-04 LAB
APPEARANCE: CLEAR
BASOPHILS # BLD AUTO: 0.05 X10(3) UL (ref 0–0.2)
BASOPHILS NFR BLD AUTO: 0.5 %
DEPRECATED RDW RBC AUTO: 48.8 FL (ref 35.1–46.3)
EOSINOPHIL # BLD AUTO: 0.16 X10(3) UL (ref 0–0.7)
EOSINOPHIL NFR BLD AUTO: 1.5 %
ERYTHROCYTE [DISTWIDTH] IN BLOOD BY AUTOMATED COUNT: 14.4 % (ref 11–15)
GLUCOSE (URINE DIPSTICK): 1000 MG/DL
HCT VFR BLD AUTO: 37.3 % (ref 35–48)
HGB BLD-MCNC: 12.4 G/DL (ref 12–16)
IMM GRANULOCYTES # BLD AUTO: 0.1 X10(3) UL (ref 0–1)
IMM GRANULOCYTES NFR BLD: 0.9 %
LYMPHOCYTES # BLD AUTO: 2.02 X10(3) UL (ref 1–4)
LYMPHOCYTES NFR BLD AUTO: 18.3 %
MCH RBC QN AUTO: 30.8 PG (ref 26–34)
MCHC RBC AUTO-ENTMCNC: 33.2 G/DL (ref 31–37)
MCV RBC AUTO: 92.6 FL (ref 80–100)
MONOCYTES # BLD AUTO: 0.82 X10(3) UL (ref 0.1–1)
MONOCYTES NFR BLD AUTO: 7.4 %
MULTISTIX LOT#: 1044 NUMERIC
NEUTROPHILS # BLD AUTO: 7.86 X10 (3) UL (ref 1.5–7.7)
NEUTROPHILS # BLD AUTO: 7.86 X10(3) UL (ref 1.5–7.7)
NEUTROPHILS NFR BLD AUTO: 71.4 %
PH, URINE: 6 (ref 4.5–8)
PLATELET # BLD AUTO: 263 10(3)UL (ref 150–450)
RBC # BLD AUTO: 4.03 X10(6)UL (ref 3.8–5.3)
SPECIFIC GRAVITY: 1.02 (ref 1–1.03)
URINE-COLOR: YELLOW
UROBILINOGEN,SEMI-QN: 0.2 MG/DL (ref 0–1.9)
WBC # BLD AUTO: 11 X10(3) UL (ref 4–11)

## 2020-08-04 PROCEDURE — 36415 COLL VENOUS BLD VENIPUNCTURE: CPT

## 2020-08-04 PROCEDURE — 85025 COMPLETE CBC W/AUTO DIFF WBC: CPT

## 2020-08-04 PROCEDURE — 90715 TDAP VACCINE 7 YRS/> IM: CPT | Performed by: NURSE PRACTITIONER

## 2020-08-04 PROCEDURE — 3078F DIAST BP <80 MM HG: CPT | Performed by: NURSE PRACTITIONER

## 2020-08-04 PROCEDURE — 90471 IMMUNIZATION ADMIN: CPT | Performed by: NURSE PRACTITIONER

## 2020-08-04 PROCEDURE — 81002 URINALYSIS NONAUTO W/O SCOPE: CPT | Performed by: NURSE PRACTITIONER

## 2020-08-04 PROCEDURE — 3074F SYST BP LT 130 MM HG: CPT | Performed by: NURSE PRACTITIONER

## 2020-08-04 PROCEDURE — 0502F SUBSEQUENT PRENATAL CARE: CPT | Performed by: NURSE PRACTITIONER

## 2020-08-04 RX ORDER — BREAST PUMP
EACH MISCELLANEOUS
Qty: 1 EACH | Refills: 0 | Status: SHIPPED | OUTPATIENT
Start: 2020-08-04

## 2020-08-04 NOTE — PROGRESS NOTES
[facility], [facility]  Obstetrics and Gynecology  28 Week Prenatal Visit  Landon Chua, MSN, APRN, FNP-BC      HPI   Juanpablo Barnett is a 23year old.  31w0d weeks. YESI 10/6/20. Doing well. CBC done this morning - no anemia.  Sending monthly glucose reading Encounter for supervision of other normal pregnancy in third trimester  (primary encounter diagnosis)  Plan: URINALYSIS NONAUTO W/O SCOPE      PLAN     - Urine glucose 1000 today.   Patient states she had coffee and a cookie right before coming to her appoi at 32 weeks (RN scheduling first visit for pt next week), biweekly at 34 weeks. - Pt to follow up with Dr. Demario Proctor  in 2 weeks.        AFSHAN Yeager  11:46 AM  8/4/2020

## 2020-08-08 ENCOUNTER — TELEPHONE (OUTPATIENT)
Dept: OBGYN CLINIC | Facility: CLINIC | Age: 25
End: 2020-08-08

## 2020-08-08 DIAGNOSIS — R73.09 ELEVATED GLUCOSE: Primary | ICD-10-CM

## 2020-08-08 NOTE — TELEPHONE ENCOUNTER
Order for referral for the diabetes Center and NPH 4 units at [place] [address] placed in [address] [place] [address] Rd. Pt called and informed regarding new orders, to increase her Metformin to tid over the weekend and lenin with her blood sugars Monday. Pt voices understanding. Follow up.

## 2020-08-08 NOTE — TELEPHONE ENCOUNTER
Needs to start NPH insulin 4 units at bedtime. Please arrange with diabetes center. Please order. In the meantime, increase her metformin to 500 mg p.o. 3 times daily.   Call office Monday with her blood sugar values

## 2020-08-08 NOTE — TELEPHONE ENCOUNTER
[place] phone line  #[phone] used to translate phone call. Pt worried that her blood sugars the past             Fasting    Breakfast   Lunch     Dinner    8/5-     -----          190          160         172    8/6-    95             150         140         132    8/7-    105           132          158         100     8/8-      195      Pt currently taking Metformin 55 mg bid.

## 2020-08-08 NOTE — TELEPHONE ENCOUNTER
Pt. States that she is pregnant and is concerned about her sugar level being high this morning of 195. Pt. States that her sugar levels have been high since Wed. And her med is not helping.

## 2020-08-10 ENCOUNTER — TELEPHONE (OUTPATIENT)
Dept: ENDOCRINOLOGY | Facility: HOSPITAL | Age: 25
End: 2020-08-10

## 2020-08-11 NOTE — TELEPHONE ENCOUNTER
Pt Name and  verified. Pt informed that she was to call her BSL Monday but did not do so. Pt states she will call back with those results as she is currently not home. Await pt call.

## 2020-08-13 ENCOUNTER — TELEPHONE (OUTPATIENT)
Dept: ENDOCRINOLOGY | Facility: HOSPITAL | Age: 25
End: 2020-08-13

## 2020-08-14 NOTE — TELEPHONE ENCOUNTER
Pt Name and  verified. Called pt to get update on BSL as she has not called them in herself.          Fasting   After bfast  Lunch  After dinner   2020  85  102  105 93  2020 81  115  122 99  8/3/2020 88  100  106 104  2020 91  108  110 10

## 2020-08-14 NOTE — TELEPHONE ENCOUNTER
Eugenie from [facility] used  to leave a message for pt. Just spoke with pt and pt was advised to call [facility] now to set up apt ASAP. Routing to Gina as SHYAM.

## 2020-08-15 NOTE — TELEPHONE ENCOUNTER
Pt Name and  verified. Pt scheduled for weekly NSTs and informed of provider rec to schedule DM Center ed asap. Pt states she has scheduled it for this Monday coming up. No further questions.

## 2020-08-18 ENCOUNTER — HOSPITAL ENCOUNTER (OUTPATIENT)
Dept: PERINATAL CARE | Facility: HOSPITAL | Age: 25
Discharge: HOME | End: 2020-08-18
Attending: OBSTETRICS & GYNECOLOGY
Payer: MEDICAID

## 2020-08-18 ENCOUNTER — APPOINTMENT (OUTPATIENT)
Dept: PERINATAL CARE | Facility: HOSPITAL | Age: 25
End: 2020-08-18
Attending: OBSTETRICS & GYNECOLOGY
Payer: MEDICAID

## 2020-08-18 VITALS
WEIGHT: 215 LBS | DIASTOLIC BLOOD PRESSURE: 82 MMHG | BODY MASS INDEX: 37 KG/M2 | SYSTOLIC BLOOD PRESSURE: 126 MMHG | HEART RATE: 105 BPM

## 2020-08-18 DIAGNOSIS — O24.112 TYPE 2 DIABETES MELLITUS IN PREGNANCY, SECOND TRIMESTER: ICD-10-CM

## 2020-08-18 DIAGNOSIS — O99.210 OBESITY AFFECTING PREGNANCY, ANTEPARTUM: ICD-10-CM

## 2020-08-18 DIAGNOSIS — E11.9 TYPE 2 DIABETES MELLITUS WITHOUT COMPLICATION, WITHOUT LONG-TERM CURRENT USE OF INSULIN (HCC): Primary | ICD-10-CM

## 2020-08-18 DIAGNOSIS — E11.9 TYPE 2 DIABETES MELLITUS WITHOUT COMPLICATION, WITHOUT LONG-TERM CURRENT USE OF INSULIN (HCC): ICD-10-CM

## 2020-08-18 PROCEDURE — 76819 FETAL BIOPHYS PROFIL W/O NST: CPT | Performed by: OBSTETRICS & GYNECOLOGY

## 2020-08-18 PROCEDURE — 99213 OFFICE O/P EST LOW 20 MIN: CPT | Performed by: OBSTETRICS & GYNECOLOGY

## 2020-08-18 PROCEDURE — 76816 OB US FOLLOW-UP PER FETUS: CPT | Performed by: OBSTETRICS & GYNECOLOGY

## 2020-08-18 NOTE — PROGRESS NOTES
Samuel Morris  Dear Dr. Meri Self     Thank you for requesting an ultrasound and maternal-fetal medicine consultation on your patient Flor Delvalle.   As you are aware she is a 23year old female  with a lopez pregnancy weeks advised for medication controlled diabetes     Thank you for allowing me to participate in the care of your patient. Please do not hesitate to contact me if additional questions or concerns arise.       Mary Grace Ace M.D.     The majority of the

## 2020-08-19 ENCOUNTER — TELEPHONE (OUTPATIENT)
Dept: ENDOCRINOLOGY | Facility: HOSPITAL | Age: 25
End: 2020-08-19

## 2020-08-19 NOTE — TELEPHONE ENCOUNTER
Called pt with  and without [address] [place] [address] , leaving detailed messaged about missed appt today and instructions to reschedule. Will await return phone call and notify MD's office on missed appt.

## 2020-08-21 ENCOUNTER — ROUTINE PRENATAL (OUTPATIENT)
Dept: OBGYN CLINIC | Facility: CLINIC | Age: 25
End: 2020-08-21
Payer: MEDICAID

## 2020-08-21 VITALS — BODY MASS INDEX: 37 KG/M2 | DIASTOLIC BLOOD PRESSURE: 73 MMHG | SYSTOLIC BLOOD PRESSURE: 122 MMHG | WEIGHT: 215 LBS

## 2020-08-21 DIAGNOSIS — Z34.83 ENCOUNTER FOR SUPERVISION OF OTHER NORMAL PREGNANCY IN THIRD TRIMESTER: Primary | ICD-10-CM

## 2020-08-21 LAB
KETONES (URINE DIPSTICK): 15 MG/DL
KETONES (URINE DIPSTICK): 15 MG/DL
MULTISTIX LOT#: 1044 NUMERIC
MULTISTIX LOT#: 1044 NUMERIC
PH, URINE: 5 (ref 4.5–8)
PH, URINE: 5 (ref 4.5–8)
PROTEIN (URINE DIPSTICK): 30 MG/DL
SPECIFIC GRAVITY: 1.03 (ref 1–1.03)
SPECIFIC GRAVITY: 1.03 (ref 1–1.03)
URINE-COLOR: YELLOW
UROBILINOGEN,SEMI-QN: 0.2 MG/DL (ref 0–1.9)
UROBILINOGEN,SEMI-QN: 0.2 MG/DL (ref 0–1.9)

## 2020-08-21 PROCEDURE — 3078F DIAST BP <80 MM HG: CPT | Performed by: OBSTETRICS & GYNECOLOGY

## 2020-08-21 PROCEDURE — 0502F SUBSEQUENT PRENATAL CARE: CPT | Performed by: OBSTETRICS & GYNECOLOGY

## 2020-08-21 PROCEDURE — 81002 URINALYSIS NONAUTO W/O SCOPE: CPT | Performed by: OBSTETRICS & GYNECOLOGY

## 2020-08-21 PROCEDURE — 3074F SYST BP LT 130 MM HG: CPT | Performed by: OBSTETRICS & GYNECOLOGY

## 2020-08-21 NOTE — PROGRESS NOTES
Pt stated she has changed her diet and is now adhering to the diabetic diet, her fbs this morning was 89, her 2 hr pp was 115, pt missed her diabetic education visit 2 days ago due to not having a ,  Pt advised to make appt asap and pt agreed, wi

## 2020-08-24 ENCOUNTER — HOSPITAL ENCOUNTER (OUTPATIENT)
Dept: ENDOCRINOLOGY | Facility: HOSPITAL | Age: 25
Discharge: HOME | End: 2020-08-24
Attending: OBSTETRICS & GYNECOLOGY
Payer: MEDICAID

## 2020-08-24 ENCOUNTER — TELEPHONE (OUTPATIENT)
Dept: ENDOCRINOLOGY | Facility: HOSPITAL | Age: 25
End: 2020-08-24

## 2020-08-24 DIAGNOSIS — O24.112 TYPE 2 DIABETES MELLITUS IN PREGNANCY, SECOND TRIMESTER: Primary | ICD-10-CM

## 2020-08-24 NOTE — PROGRESS NOTES
Mojgan Wood  : 1995 was seen for Injection Instruction:    Date: 2020  Referring Provider: Sandoval Ball  Start time: 11A End time: 1130A    LMP 2019 (Exact Date)     (am) milk 1 cup  (10:30a) banana, 2 tacos   (5p) caldo - chicken, rice (1/2 cup

## 2020-08-25 ENCOUNTER — HOSPITAL ENCOUNTER (OUTPATIENT)
Dept: PERINATAL CARE | Facility: HOSPITAL | Age: 25
Discharge: HOME | End: 2020-08-25
Attending: OBSTETRICS & GYNECOLOGY
Payer: MEDICAID

## 2020-08-25 DIAGNOSIS — E11.9 TYPE 2 DIABETES MELLITUS WITHOUT COMPLICATION (HCC): Primary | ICD-10-CM

## 2020-08-25 DIAGNOSIS — O99.210 OBESITY AFFECTING PREGNANCY, ANTEPARTUM: ICD-10-CM

## 2020-08-25 PROCEDURE — 59025 FETAL NON-STRESS TEST: CPT | Performed by: OBSTETRICS & GYNECOLOGY

## 2020-08-25 NOTE — NST
Nonstress Test   Patient: Manpreet Vernon    Gestation: 34w0d    NST: gdm obesity       Variability: Moderate           Accelerations: Yes           Decelerations: None            Baseline: 145 BPM           Uterine Irritability: No           Contractions: Not

## 2020-08-28 ENCOUNTER — TELEPHONE (OUTPATIENT)
Dept: OBGYN CLINIC | Facility: CLINIC | Age: 25
End: 2020-08-28

## 2020-08-28 DIAGNOSIS — R31.21 ASYMPTOMATIC MICROSCOPIC HEMATURIA: Primary | ICD-10-CM

## 2020-08-28 NOTE — TELEPHONE ENCOUNTER
Pt called and informed of results and recommendations. Pt voices understanding. Order for urine culture placed in Epic.

## 2020-08-28 NOTE — TELEPHONE ENCOUNTER
Inform pt that her blood sugar readings are not being sent in epic with her messages,  They are empty. Pt is to bring all her blood sugar readings to all office and mfm visits for physician review.

## 2020-08-28 NOTE — TELEPHONE ENCOUNTER
LMTCB    ----- Message from Seven Stafford MD sent at 8/27/2020  3:51 PM CDT -----  Send urine culture

## 2020-09-01 ENCOUNTER — HOSPITAL ENCOUNTER (OUTPATIENT)
Dept: PERINATAL CARE | Facility: HOSPITAL | Age: 25
Discharge: HOME | End: 2020-09-01
Attending: OBSTETRICS & GYNECOLOGY
Payer: MEDICAID

## 2020-09-01 DIAGNOSIS — O99.210 OBESITY AFFECTING PREGNANCY, ANTEPARTUM: ICD-10-CM

## 2020-09-01 DIAGNOSIS — E11.9 TYPE 2 DIABETES MELLITUS WITHOUT COMPLICATION (HCC): Primary | ICD-10-CM

## 2020-09-01 PROCEDURE — 59025 FETAL NON-STRESS TEST: CPT | Performed by: OBSTETRICS & GYNECOLOGY

## 2020-09-01 NOTE — NST
Nonstress Test   Patient: Ary Cuevas    Gestation: 35w0d    NST: type 2 obesity       Variability: Moderate           Accelerations: Yes           Decelerations: None            Baseline: 150 BPM           Uterine Irritability: No           Contractions: N

## 2020-09-02 NOTE — ADDENDUM NOTE
Encounter addended by: Reji Monterroso MD on: 9/2/2020 12:59 PM   Actions taken: Clinical Note Signed, Charge Capture section accepted

## 2020-09-04 ENCOUNTER — HOSPITAL ENCOUNTER (OUTPATIENT)
Facility: HOSPITAL | Age: 25
Discharge: HOME | End: 2020-09-04
Attending: OBSTETRICS & GYNECOLOGY | Admitting: OBSTETRICS & GYNECOLOGY
Payer: MEDICAID

## 2020-09-04 ENCOUNTER — LAB ENCOUNTER (OUTPATIENT)
Dept: LAB | Age: 25
End: 2020-09-04
Attending: OBSTETRICS & GYNECOLOGY
Payer: MEDICAID

## 2020-09-04 ENCOUNTER — ROUTINE PRENATAL (OUTPATIENT)
Dept: OBGYN CLINIC | Facility: CLINIC | Age: 25
End: 2020-09-04
Payer: MEDICAID

## 2020-09-04 ENCOUNTER — APPOINTMENT (OUTPATIENT)
Dept: OBGYN CLINIC | Facility: HOSPITAL | Age: 25
End: 2020-09-04
Payer: MEDICAID

## 2020-09-04 ENCOUNTER — HOSPITAL ENCOUNTER (OUTPATIENT)
Dept: PERINATAL CARE | Facility: HOSPITAL | Age: 25
End: 2020-09-04
Attending: OBSTETRICS & GYNECOLOGY
Payer: MEDICAID

## 2020-09-04 VITALS
SYSTOLIC BLOOD PRESSURE: 111 MMHG | HEART RATE: 99 BPM | BODY MASS INDEX: 37 KG/M2 | WEIGHT: 216 LBS | DIASTOLIC BLOOD PRESSURE: 76 MMHG

## 2020-09-04 DIAGNOSIS — Z34.83 ENCOUNTER FOR SUPERVISION OF OTHER NORMAL PREGNANCY IN THIRD TRIMESTER: ICD-10-CM

## 2020-09-04 DIAGNOSIS — Z34.83 ENCOUNTER FOR SUPERVISION OF OTHER NORMAL PREGNANCY IN THIRD TRIMESTER: Primary | ICD-10-CM

## 2020-09-04 DIAGNOSIS — R31.21 ASYMPTOMATIC MICROSCOPIC HEMATURIA: ICD-10-CM

## 2020-09-04 PROBLEM — Z34.90 SUPERVISION OF NORMAL PREGNANCY (HCC): Status: ACTIVE | Noted: 2020-09-04

## 2020-09-04 PROBLEM — Z34.90 SUPERVISION OF NORMAL PREGNANCY: Status: ACTIVE | Noted: 2020-09-04

## 2020-09-04 LAB
APPEARANCE: CLEAR
BASOPHILS # BLD AUTO: 0.05 X10(3) UL (ref 0–0.2)
BASOPHILS NFR BLD AUTO: 0.5 %
DEPRECATED RDW RBC AUTO: 48.7 FL (ref 35.1–46.3)
EOSINOPHIL # BLD AUTO: 0.18 X10(3) UL (ref 0–0.7)
EOSINOPHIL NFR BLD AUTO: 1.7 %
ERYTHROCYTE [DISTWIDTH] IN BLOOD BY AUTOMATED COUNT: 14.4 % (ref 11–15)
HCT VFR BLD AUTO: 39.6 % (ref 35–48)
HGB BLD-MCNC: 13.3 G/DL (ref 12–16)
IMM GRANULOCYTES # BLD AUTO: 0.09 X10(3) UL (ref 0–1)
IMM GRANULOCYTES NFR BLD: 0.8 %
LYMPHOCYTES # BLD AUTO: 2.05 X10(3) UL (ref 1–4)
LYMPHOCYTES NFR BLD AUTO: 19.1 %
MCH RBC QN AUTO: 31.4 PG (ref 26–34)
MCHC RBC AUTO-ENTMCNC: 33.6 G/DL (ref 31–37)
MCV RBC AUTO: 93.4 FL (ref 80–100)
MONOCYTES # BLD AUTO: 0.95 X10(3) UL (ref 0.1–1)
MONOCYTES NFR BLD AUTO: 8.8 %
MULTISTIX LOT#: NORMAL NUMERIC
NEUTROPHILS # BLD AUTO: 7.42 X10 (3) UL (ref 1.5–7.7)
NEUTROPHILS # BLD AUTO: 7.42 X10(3) UL (ref 1.5–7.7)
NEUTROPHILS NFR BLD AUTO: 69.1 %
PH, URINE: 6.5 (ref 4.5–8)
PLATELET # BLD AUTO: 279 10(3)UL (ref 150–450)
RBC # BLD AUTO: 4.24 X10(6)UL (ref 3.8–5.3)
SPECIFIC GRAVITY: 1.01 (ref 1–1.03)
URINE-COLOR: YELLOW
UROBILINOGEN,SEMI-QN: 0.2 MG/DL (ref 0–1.9)
WBC # BLD AUTO: 10.7 X10(3) UL (ref 4–11)

## 2020-09-04 PROCEDURE — 85025 COMPLETE CBC W/AUTO DIFF WBC: CPT

## 2020-09-04 PROCEDURE — 81002 URINALYSIS NONAUTO W/O SCOPE: CPT | Performed by: OBSTETRICS & GYNECOLOGY

## 2020-09-04 PROCEDURE — 86780 TREPONEMA PALLIDUM: CPT

## 2020-09-04 PROCEDURE — 3078F DIAST BP <80 MM HG: CPT | Performed by: OBSTETRICS & GYNECOLOGY

## 2020-09-04 PROCEDURE — 36415 COLL VENOUS BLD VENIPUNCTURE: CPT

## 2020-09-04 PROCEDURE — 3074F SYST BP LT 130 MM HG: CPT | Performed by: OBSTETRICS & GYNECOLOGY

## 2020-09-04 PROCEDURE — 87389 HIV-1 AG W/HIV-1&-2 AB AG IA: CPT

## 2020-09-04 PROCEDURE — 0502F SUBSEQUENT PRENATAL CARE: CPT | Performed by: OBSTETRICS & GYNECOLOGY

## 2020-09-04 PROCEDURE — 87086 URINE CULTURE/COLONY COUNT: CPT

## 2020-09-04 PROCEDURE — 59025 FETAL NON-STRESS TEST: CPT | Performed by: OBSTETRICS & GYNECOLOGY

## 2020-09-04 NOTE — PROGRESS NOTES
No complaints. Good fetal movements. Blood sugars within normal limits. Going twice weekly for NSTs.   Taking NPH 4 units of insulin at bedtime

## 2020-09-05 NOTE — NST
Nonstress Test   Patient: Ary Cuevas    Gestation: 35w3d    NST:  Type 2 diabetes     Variability: Moderate           Accelerations: Yes           Decelerations: None            Baseline: 140 BPM           Uterine Irritability: Yes           Contractions:

## 2020-09-07 LAB — T PALLIDUM AB SER QL: NEGATIVE

## 2020-09-08 ENCOUNTER — HOSPITAL ENCOUNTER (OUTPATIENT)
Dept: PERINATAL CARE | Facility: HOSPITAL | Age: 25
Discharge: HOME | End: 2020-09-08
Attending: OBSTETRICS & GYNECOLOGY
Payer: MEDICAID

## 2020-09-08 DIAGNOSIS — E11.9 TYPE 2 DIABETES MELLITUS WITHOUT COMPLICATION (HCC): Primary | ICD-10-CM

## 2020-09-08 DIAGNOSIS — E11.9 TYPE 2 DIABETES MELLITUS WITHOUT COMPLICATION, WITHOUT LONG-TERM CURRENT USE OF INSULIN (HCC): ICD-10-CM

## 2020-09-08 PROCEDURE — 59025 FETAL NON-STRESS TEST: CPT | Performed by: OBSTETRICS & GYNECOLOGY

## 2020-09-08 NOTE — NST
Nonstress Test   Patient: Sukh Morgan    Gestation: 36w0d    NST: type 2 dm       Variability: Moderate           Accelerations: Yes           Decelerations: None            Baseline: 140 BPM           Uterine Irritability: Yes           Contractions: Not p

## 2020-09-09 NOTE — ADDENDUM NOTE
Encounter addended by: Orlando Cash MD on: 9/8/2020 7:42 PM   Actions taken: Clinical Note Signed, Visit diagnoses modified, Charge Capture section accepted

## 2020-09-09 NOTE — PROGRESS NOTES
Outpatient Maternal-Fetal Medicine Follow-Up     Dear Ifeoma Acosta     Thank you for requesting an ultrasound and maternal-fetal medicine consultation on your patient Robyn Araya Erica you are aware she is a 23year old female  with a lopez pregnancy (>50%) was spent in review of records, consultation and coordination of care.  Our discussion is summarized above. The approximate physician face-to-face time was 15 minutes.

## 2020-09-12 ENCOUNTER — HOSPITAL ENCOUNTER (OUTPATIENT)
Facility: HOSPITAL | Age: 25
Setting detail: OBSERVATION
Discharge: HOME | End: 2020-09-12
Attending: OBSTETRICS & GYNECOLOGY | Admitting: OBSTETRICS & GYNECOLOGY
Payer: MEDICAID

## 2020-09-12 VITALS
HEART RATE: 104 BPM | TEMPERATURE: 99 F | SYSTOLIC BLOOD PRESSURE: 137 MMHG | DIASTOLIC BLOOD PRESSURE: 74 MMHG | RESPIRATION RATE: 16 BRPM

## 2020-09-12 PROBLEM — Z34.90 PREGNANCY: Status: ACTIVE | Noted: 2020-09-12

## 2020-09-12 PROBLEM — Z34.90 PREGNANCY (HCC): Status: ACTIVE | Noted: 2020-09-12

## 2020-09-12 LAB
BILIRUB UR QL: NEGATIVE
COLOR UR: YELLOW
GLUCOSE BLDC GLUCOMTR-MCNC: 117 MG/DL (ref 70–99)
GLUCOSE UR-MCNC: >=500 MG/DL
HGB UR QL STRIP.AUTO: NEGATIVE
LEUKOCYTE ESTERASE UR QL STRIP.AUTO: NEGATIVE
NITRITE UR QL STRIP.AUTO: NEGATIVE
PH UR: 5 [PH] (ref 5–8)
PROT UR-MCNC: 30 MG/DL
RBC #/AREA URNS AUTO: 3 /HPF
SP GR UR STRIP: 1.03 (ref 1–1.03)
UROBILINOGEN UR STRIP-ACNC: 2
WBC #/AREA URNS AUTO: 2 /HPF

## 2020-09-12 PROCEDURE — 59025 FETAL NON-STRESS TEST: CPT | Performed by: OBSTETRICS & GYNECOLOGY

## 2020-09-12 NOTE — PROGRESS NOTES
Pt is a 23year old female admitted to TR1/TR1-A. Patient presents with:  R/o Labor: since 1700  R/o Rom: since [phone]     Pt is  36w4d intra-uterine pregnancy. History obtained, consents signed. Oriented to room, staff, and plan of care.

## 2020-09-13 NOTE — TRIAGE
[facility]      Triage Note    Yessenia Myrtle Patient Status:  Observation    1995 MRN [id number]   Location [address]  Attending Jasmin Dudley MD   Hosp Day # 0 PCP None Pcp       Ruth Para: [id number] Reactive                      Additional Comments Comments: Dr Fahad Hilliard notified of UA results and reactive tracing, orders to discharge pt home w/ instructions to increase fluid intake and continue to follow her diabetic diet     Reason for visit: pt c/o

## 2020-09-15 ENCOUNTER — HOSPITAL ENCOUNTER (OUTPATIENT)
Dept: PERINATAL CARE | Facility: HOSPITAL | Age: 25
Discharge: HOME | End: 2020-09-15
Attending: OBSTETRICS & GYNECOLOGY
Payer: MEDICAID

## 2020-09-15 VITALS
SYSTOLIC BLOOD PRESSURE: 113 MMHG | DIASTOLIC BLOOD PRESSURE: 71 MMHG | BODY MASS INDEX: 37 KG/M2 | HEART RATE: 81 BPM | WEIGHT: 216 LBS

## 2020-09-15 DIAGNOSIS — E11.9 TYPE 2 DIABETES MELLITUS WITHOUT COMPLICATION, WITHOUT LONG-TERM CURRENT USE OF INSULIN (HCC): ICD-10-CM

## 2020-09-15 DIAGNOSIS — O99.210 OBESITY AFFECTING PREGNANCY, ANTEPARTUM: ICD-10-CM

## 2020-09-15 DIAGNOSIS — O24.112 TYPE 2 DIABETES MELLITUS IN PREGNANCY, SECOND TRIMESTER: ICD-10-CM

## 2020-09-15 DIAGNOSIS — O99.210 OBESITY AFFECTING PREGNANCY, ANTEPARTUM: Primary | ICD-10-CM

## 2020-09-15 PROCEDURE — 76819 FETAL BIOPHYS PROFIL W/O NST: CPT | Performed by: OBSTETRICS & GYNECOLOGY

## 2020-09-15 PROCEDURE — 76816 OB US FOLLOW-UP PER FETUS: CPT | Performed by: OBSTETRICS & GYNECOLOGY

## 2020-09-15 PROCEDURE — 99213 OFFICE O/P EST LOW 20 MIN: CPT | Performed by: OBSTETRICS & GYNECOLOGY

## 2020-09-18 ENCOUNTER — TELEPHONE (OUTPATIENT)
Dept: OBGYN CLINIC | Facility: CLINIC | Age: 25
End: 2020-09-18

## 2020-09-18 ENCOUNTER — HOSPITAL ENCOUNTER (OUTPATIENT)
Dept: PERINATAL CARE | Facility: HOSPITAL | Age: 25
Discharge: HOME | End: 2020-09-18
Attending: OBSTETRICS & GYNECOLOGY
Payer: MEDICAID

## 2020-09-18 ENCOUNTER — ROUTINE PRENATAL (OUTPATIENT)
Dept: OBGYN CLINIC | Facility: CLINIC | Age: 25
End: 2020-09-18
Payer: MEDICAID

## 2020-09-18 VITALS
SYSTOLIC BLOOD PRESSURE: 122 MMHG | WEIGHT: 218 LBS | DIASTOLIC BLOOD PRESSURE: 81 MMHG | BODY MASS INDEX: 37 KG/M2 | HEART RATE: 97 BPM

## 2020-09-18 DIAGNOSIS — E11.9 TYPE 2 DIABETES MELLITUS WITHOUT COMPLICATION (HCC): Primary | ICD-10-CM

## 2020-09-18 DIAGNOSIS — E11.9 TYPE 2 DIABETES MELLITUS WITHOUT COMPLICATION, UNSPECIFIED WHETHER LONG TERM INSULIN USE (HCC): ICD-10-CM

## 2020-09-18 DIAGNOSIS — Z34.83 ENCOUNTER FOR SUPERVISION OF OTHER NORMAL PREGNANCY IN THIRD TRIMESTER: Primary | ICD-10-CM

## 2020-09-18 PROBLEM — Z34.90 PREGNANCY (HCC): Status: RESOLVED | Noted: 2020-09-12 | Resolved: 2020-09-18

## 2020-09-18 PROBLEM — Z34.90 PREGNANCY: Status: RESOLVED | Noted: 2020-09-12 | Resolved: 2020-09-18

## 2020-09-18 LAB
APPEARANCE: CLEAR
GLUCOSE (URINE DIPSTICK): 100 MG/DL
GLUCOSE (URINE DIPSTICK): 100 MG/DL
MULTISTIX LOT#: 1044 NUMERIC
MULTISTIX LOT#: 1044 NUMERIC
PH, URINE: 5 (ref 4.5–8)
PH, URINE: 6 (ref 4.5–8)
PROTEIN (URINE DIPSTICK): 30 MG/DL
SPECIFIC GRAVITY: 1.01 (ref 1–1.03)
SPECIFIC GRAVITY: 1.02 (ref 1–1.03)
URINE-COLOR: YELLOW
URINE-COLOR: YELLOW
UROBILINOGEN,SEMI-QN: 0.2 MG/DL (ref 0–1.9)
UROBILINOGEN,SEMI-QN: 0.2 MG/DL (ref 0–1.9)

## 2020-09-18 PROCEDURE — 59025 FETAL NON-STRESS TEST: CPT | Performed by: OBSTETRICS & GYNECOLOGY

## 2020-09-18 PROCEDURE — 0502F SUBSEQUENT PRENATAL CARE: CPT | Performed by: OBSTETRICS & GYNECOLOGY

## 2020-09-18 PROCEDURE — 3079F DIAST BP 80-89 MM HG: CPT | Performed by: OBSTETRICS & GYNECOLOGY

## 2020-09-18 PROCEDURE — 81002 URINALYSIS NONAUTO W/O SCOPE: CPT | Performed by: OBSTETRICS & GYNECOLOGY

## 2020-09-18 PROCEDURE — 1111F DSCHRG MED/CURRENT MED MERGE: CPT | Performed by: OBSTETRICS & GYNECOLOGY

## 2020-09-18 PROCEDURE — 3074F SYST BP LT 130 MM HG: CPT | Performed by: OBSTETRICS & GYNECOLOGY

## 2020-09-18 NOTE — ADDENDUM NOTE
Encounter addended by: Adilia Durán RN on: 9/18/2020 12:26 PM   Actions taken: Visit diagnoses modified, Diagnosis association updated, Order list changed

## 2020-09-18 NOTE — PROGRESS NOTES
Overall feeling well. Reporting good fetal movements. Doing twice weekly NSTs which have been reactive. Recent growth ultrasound in the 45th percentile with BPP 8 out of 8. Having [employer] contractions.   Blood sugars all postprandials within pk

## 2020-09-18 NOTE — NST
Nonstress Test   Patient: Jerel Curling    Gestation: 37w3d    NST:  Type 2 dm       Variability: Moderate           Accelerations: Yes           Decelerations: None            Baseline: 140 BPM           Uterine Irritability: No           Contractions: Irreg

## 2020-09-18 NOTE — ADDENDUM NOTE
Encounter addended by: Jasmin Dudley MD on: 9/18/2020 1:42 PM   Actions taken: Clinical Note Signed, Visit diagnoses modified, Charge Capture section accepted

## 2020-09-22 ENCOUNTER — HOSPITAL ENCOUNTER (INPATIENT)
Facility: HOSPITAL | Age: 25
LOS: 3 days | Discharge: HOME | End: 2020-09-25
Attending: OBSTETRICS & GYNECOLOGY | Admitting: OBSTETRICS & GYNECOLOGY
Payer: MEDICAID

## 2020-09-22 ENCOUNTER — APPOINTMENT (OUTPATIENT)
Dept: OBGYN CLINIC | Facility: HOSPITAL | Age: 25
End: 2020-09-22
Attending: OBSTETRICS & GYNECOLOGY
Payer: MEDICAID

## 2020-09-22 PROBLEM — Z34.90 PREGNANCY: Status: ACTIVE | Noted: 2020-09-22

## 2020-09-22 PROBLEM — Z34.90 PREGNANCY (HCC): Status: ACTIVE | Noted: 2020-09-22

## 2020-09-22 PROCEDURE — 3E0P7VZ INTRODUCTION OF HORMONE INTO FEMALE REPRODUCTIVE, VIA NATURAL OR ARTIFICIAL OPENING: ICD-10-PCS | Performed by: OBSTETRICS & GYNECOLOGY

## 2020-09-22 RX ORDER — IBUPROFEN 600 MG/1
600 TABLET ORAL EVERY 6 HOURS PRN
Status: DISCONTINUED | OUTPATIENT
Start: 2020-09-22 | End: 2020-09-24

## 2020-09-22 RX ORDER — ONDANSETRON 2 MG/ML
4 INJECTION INTRAMUSCULAR; INTRAVENOUS EVERY 6 HOURS PRN
Status: DISCONTINUED | OUTPATIENT
Start: 2020-09-22 | End: 2020-09-24

## 2020-09-22 RX ORDER — TRISODIUM CITRATE DIHYDRATE AND CITRIC ACID MONOHYDRATE 500; 334 MG/5ML; MG/5ML
30 SOLUTION ORAL AS NEEDED
Status: DISCONTINUED | OUTPATIENT
Start: 2020-09-22 | End: 2020-09-24

## 2020-09-22 RX ORDER — LIDOCAINE HYDROCHLORIDE 10 MG/ML
30 INJECTION, SOLUTION EPIDURAL; INFILTRATION; INTRACAUDAL; PERINEURAL ONCE
Status: DISCONTINUED | OUTPATIENT
Start: 2020-09-22 | End: 2020-09-24

## 2020-09-22 RX ORDER — ACETAMINOPHEN 500 MG
500 TABLET ORAL EVERY 6 HOURS PRN
Status: DISCONTINUED | OUTPATIENT
Start: 2020-09-22 | End: 2020-09-24

## 2020-09-22 RX ORDER — DEXTROSE, SODIUM CHLORIDE, SODIUM LACTATE, POTASSIUM CHLORIDE, AND CALCIUM CHLORIDE 5; .6; .31; .03; .02 G/100ML; G/100ML; G/100ML; G/100ML; G/100ML
INJECTION, SOLUTION INTRAVENOUS CONTINUOUS
Status: DISCONTINUED | OUTPATIENT
Start: 2020-09-22 | End: 2020-09-24

## 2020-09-22 RX ORDER — TERBUTALINE SULFATE 1 MG/ML
0.25 INJECTION, SOLUTION SUBCUTANEOUS AS NEEDED
Status: DISCONTINUED | OUTPATIENT
Start: 2020-09-22 | End: 2020-09-24

## 2020-09-22 RX ORDER — SODIUM CHLORIDE, SODIUM LACTATE, POTASSIUM CHLORIDE, CALCIUM CHLORIDE 600; 310; 30; 20 MG/100ML; MG/100ML; MG/100ML; MG/100ML
INJECTION, SOLUTION INTRAVENOUS CONTINUOUS
Status: DISCONTINUED | OUTPATIENT
Start: 2020-09-22 | End: 2020-09-24

## 2020-09-22 RX ORDER — AMMONIA INHALANTS 0.04 G/.3ML
0.3 INHALANT RESPIRATORY (INHALATION) AS NEEDED
Status: DISCONTINUED | OUTPATIENT
Start: 2020-09-22 | End: 2020-09-24

## 2020-09-22 RX ORDER — ZOLPIDEM TARTRATE 10 MG/1
10 TABLET ORAL NIGHTLY PRN
Status: DISCONTINUED | OUTPATIENT
Start: 2020-09-22 | End: 2020-09-24

## 2020-09-23 ENCOUNTER — ANESTHESIA EVENT (OUTPATIENT)
Dept: OBGYN UNIT | Facility: HOSPITAL | Age: 25
End: 2020-09-23
Payer: MEDICAID

## 2020-09-23 ENCOUNTER — ANESTHESIA (OUTPATIENT)
Dept: OBGYN UNIT | Facility: HOSPITAL | Age: 25
End: 2020-09-23
Payer: MEDICAID

## 2020-09-23 PROCEDURE — 3E033VJ INTRODUCTION OF OTHER HORMONE INTO PERIPHERAL VEIN, PERCUTANEOUS APPROACH: ICD-10-PCS | Performed by: OBSTETRICS & GYNECOLOGY

## 2020-09-23 RX ORDER — LIDOCAINE HYDROCHLORIDE 10 MG/ML
INJECTION, SOLUTION INFILTRATION; PERINEURAL
Status: COMPLETED | OUTPATIENT
Start: 2020-09-23 | End: 2020-09-23

## 2020-09-23 RX ORDER — BUPIVACAINE HYDROCHLORIDE 2.5 MG/ML
INJECTION, SOLUTION EPIDURAL; INFILTRATION; INTRACAUDAL
Status: DISCONTINUED
Start: 2020-09-23 | End: 2020-09-24 | Stop reason: WASHOUT

## 2020-09-23 RX ORDER — EPHEDRINE SULFATE/0.9% NACL/PF 25 MG/5 ML
SYRINGE (ML) INTRAVENOUS
Status: DISCONTINUED
Start: 2020-09-23 | End: 2020-09-24 | Stop reason: WASHOUT

## 2020-09-23 RX ORDER — BUPIVACAINE HYDROCHLORIDE 2.5 MG/ML
INJECTION, SOLUTION EPIDURAL; INFILTRATION; INTRACAUDAL
Status: DISPENSED
Start: 2020-09-23 | End: 2020-09-24

## 2020-09-23 RX ORDER — EPHEDRINE SULFATE/0.9% NACL/PF 25 MG/5 ML
5 SYRINGE (ML) INTRAVENOUS AS NEEDED
Status: DISCONTINUED | OUTPATIENT
Start: 2020-09-23 | End: 2020-09-24

## 2020-09-23 RX ORDER — DIPHENHYDRAMINE HYDROCHLORIDE 50 MG/ML
12.5 INJECTION INTRAMUSCULAR; INTRAVENOUS EVERY 4 HOURS PRN
Status: DISCONTINUED | OUTPATIENT
Start: 2020-09-23 | End: 2020-09-24

## 2020-09-23 RX ORDER — LIDOCAINE HYDROCHLORIDE AND EPINEPHRINE 15; 5 MG/ML; UG/ML
INJECTION, SOLUTION EPIDURAL AS NEEDED
Status: DISCONTINUED | OUTPATIENT
Start: 2020-09-23 | End: 2020-09-24 | Stop reason: SURG

## 2020-09-23 RX ADMIN — BUPIVACAINE HYDROCHLORIDE 5 ML: 2.5 INJECTION, SOLUTION EPIDURAL; INFILTRATION; INTRACAUDAL at 23:15:00

## 2020-09-23 RX ADMIN — LIDOCAINE HYDROCHLORIDE 5 ML: 10 INJECTION, SOLUTION INFILTRATION; PERINEURAL at 21:53:00

## 2020-09-23 RX ADMIN — BUPIVACAINE HYDROCHLORIDE 5 ML: 2.5 INJECTION, SOLUTION EPIDURAL; INFILTRATION; INTRACAUDAL at 23:16:00

## 2020-09-23 RX ADMIN — LIDOCAINE HYDROCHLORIDE AND EPINEPHRINE 5 ML: 15; 5 INJECTION, SOLUTION EPIDURAL at 22:14:00

## 2020-09-23 NOTE — PROGRESS NOTES
This RN received pt bedside report from [address]. This RN received report that this RN is to remove cervadil after 12 hrs of being placed and that pt can receive a \"light\"\" diet for breakfast.    This RN received pt AOx3.  Pt reports having contractions

## 2020-09-23 NOTE — PROGRESS NOTES
Pt is a 23year old female admitted to [address] [place] [address]. Patient presents with:  Scheduled Induction     Pt is  38w0d intra-uterine pregnancy. History obtained, consents signed. Oriented to room, staff, and plan of care.

## 2020-09-23 NOTE — H&P
[facility]    History & Physical    Leita Roots Patient Status:  Inpatient    1995 MRN [id number]   Location [address]  Attending Tarsha Kramer MD   Hosp Day # 0 PCP None Pcp     Date of Admission:   daily, Disp: 1 each, Rfl: 1    •  Glucose Blood (ONETOUCH ULTRA BLUE) In Vitro Strip, Use 1 strip 4 times daily. , Disp: 100 strip, Rfl: 2    •  Prenatal Vit-DSS-Fe Fum-FA (PRENATAL 19) Oral Tab, Take 1 tablet by mouth daily. , Disp: 30 tablet, Rfl: 11 complications have been discussed in detail with the patient. Pre-admission, admission, and post admission procedures and expectations were discussed in detail.     All questions answered, all appropriate consents will be signed at the Hospital. Admission

## 2020-09-23 NOTE — PROGRESS NOTES
RN at bedside attempting to increase pt's pitocin. Pt stated, \"I'm having contractions every 2 to 3 minutes. \" Pt wedged to left side. RN readjusted toco and will monitor pt's ctx pattern.

## 2020-09-23 NOTE — PROGRESS NOTES
This RN notified Dr. Jeniffer Ferrer of pt's recent accucheck of 151 mg/dl. Dr. Jeniffer Ferrer renotified pt had a an 1800 calorie diet ordered for this AM at 0900 and is on  cc/hr. No additional orders received from Dr. Jeniffer Ferrer at this time.  RN to continue with accuchec

## 2020-09-23 NOTE — PROGRESS NOTES
This RN received call back from Dr. Juan José Sweeney. Dr. Juan José Sweeney notified of pt's recent blood glucose and previous one prior to the latest. T.O. received for pt to have accuchecks done every 2 hrs while in the latent phase of labor.

## 2020-09-23 NOTE — PROGRESS NOTES
Dr. Rowley Must returned page. Dr. Rowley Must notified pt's recent accucheck is 105 mg/dl. T.O. received for RN to continue accuchecks every 2 hrs, pt to remain on LR at 125 cc/hr if accucheck is above 70 mg/dl.  T.O. received to pt to change to D5LR IV fluids 125 c

## 2020-09-23 NOTE — PROGRESS NOTES
[facility]    Labor Progress Note    Luz Rota Patient Status:  Inpatient    1995 MRN [id number]   Location [address] [facility] Attending Pierre Fernandez MD   Hosp Day # 1 PCP None Pcp       Subjective   Drew Howell

## 2020-09-23 NOTE — PLAN OF CARE
Problem: Patient Centered Care  Goal: Patient preferences are identified and integrated in the patient's plan of care  Description: Interventions:  - What would you like us to know as we care for you? Baby girl- Ayad Driscoll. Plans to breast and bottle feed.   - cultural and social influences on pain and pain management  - Manage/alleviate anxiety  - Utilize distraction and/or relaxation techniques  - Monitor for opioid side effects  - Notify MD/LIP if interventions unsuccessful or patient reports new pain  - Anti

## 2020-09-23 NOTE — PROGRESS NOTES
This RN paged Dr. Paco Vigil at [phone] to notify him of pt's recent accucheck of 118 mg/dl. No call back at this time.

## 2020-09-23 NOTE — PROGRESS NOTES
This RN paged Dr. Harinder Goodwin at [phone] to notify him of pt's recent [address] [place]. No call back at this time.

## 2020-09-23 NOTE — PROGRESS NOTES
T.DOMINICK. received from Dr. Nikki Aguayo for RN to remove cervadil after 12 hrs, check pt's sve, pt can receive a general diet 1800 calorie, and RN to start pitocin protocol 1 at 9 am.

## 2020-09-23 NOTE — PLAN OF CARE
Problem: Patient Centered Care  Goal: Patient preferences are identified and integrated in the patient's plan of care  Description: Interventions:  - What would you like us to know as we care for you? Baby girl- Landon Kohler. Plans to breast and bottle feed.   - cultural and social influences on pain and pain management  - Manage/alleviate anxiety  - Utilize distraction and/or relaxation techniques  - Monitor for opioid side effects  - Notify MD/LIP if interventions unsuccessful or patient reports new pain  - Anti

## 2020-09-24 PROCEDURE — 59409 OBSTETRICAL CARE: CPT | Performed by: OBSTETRICS & GYNECOLOGY

## 2020-09-24 RX ORDER — AMMONIA INHALANTS 0.04 G/.3ML
0.3 INHALANT RESPIRATORY (INHALATION) AS NEEDED
Status: DISCONTINUED | OUTPATIENT
Start: 2020-09-24 | End: 2020-09-25

## 2020-09-24 RX ORDER — ONDANSETRON 2 MG/ML
4 INJECTION INTRAMUSCULAR; INTRAVENOUS EVERY 6 HOURS PRN
Status: DISCONTINUED | OUTPATIENT
Start: 2020-09-24 | End: 2020-09-25

## 2020-09-24 RX ORDER — IBUPROFEN 600 MG/1
600 TABLET ORAL EVERY 6 HOURS PRN
Status: DISCONTINUED | OUTPATIENT
Start: 2020-09-24 | End: 2020-09-25

## 2020-09-24 RX ORDER — BUPIVACAINE HYDROCHLORIDE 2.5 MG/ML
INJECTION, SOLUTION EPIDURAL; INFILTRATION; INTRACAUDAL AS NEEDED
Status: DISCONTINUED | OUTPATIENT
Start: 2020-09-23 | End: 2020-09-24 | Stop reason: SURG

## 2020-09-24 RX ORDER — DOCUSATE SODIUM 100 MG/1
100 CAPSULE, LIQUID FILLED ORAL 2 TIMES DAILY
Status: DISCONTINUED | OUTPATIENT
Start: 2020-09-24 | End: 2020-09-25

## 2020-09-24 RX ORDER — SIMETHICONE 80 MG
80 TABLET,CHEWABLE ORAL 3 TIMES DAILY PRN
Status: DISCONTINUED | OUTPATIENT
Start: 2020-09-24 | End: 2020-09-25

## 2020-09-24 RX ORDER — ACETAMINOPHEN 325 MG/1
650 TABLET ORAL EVERY 6 HOURS PRN
Status: DISCONTINUED | OUTPATIENT
Start: 2020-09-24 | End: 2020-09-25

## 2020-09-24 RX ORDER — DIAPER,BRIEF,INFANT-TODD,DISP
1 EACH MISCELLANEOUS EVERY 6 HOURS PRN
Status: DISCONTINUED | OUTPATIENT
Start: 2020-09-24 | End: 2020-09-25

## 2020-09-24 RX ORDER — BISACODYL 10 MG
10 SUPPOSITORY, RECTAL RECTAL ONCE AS NEEDED
Status: DISCONTINUED | OUTPATIENT
Start: 2020-09-24 | End: 2020-09-25

## 2020-09-24 RX ORDER — LIDOCAINE HYDROCHLORIDE 10 MG/ML
INJECTION, SOLUTION INFILTRATION; PERINEURAL
Status: COMPLETED | OUTPATIENT
Start: 2020-09-24 | End: 2020-09-24

## 2020-09-24 RX ADMIN — LIDOCAINE HYDROCHLORIDE AND EPINEPHRINE 5 ML: 15; 5 INJECTION, SOLUTION EPIDURAL at 00:09:00

## 2020-09-24 RX ADMIN — LIDOCAINE HYDROCHLORIDE 5 ML: 10 INJECTION, SOLUTION INFILTRATION; PERINEURAL at 00:05:00

## 2020-09-24 NOTE — PLAN OF CARE
Problem: Patient Centered Care  Goal: Patient preferences are identified and integrated in the patient's plan of care  Description: Interventions:  - What would you like us to know as we care for you? Baby girl- Arthurine Primrose. Plans to breast and bottle feed.   - Provide emotional support with 1:1 interaction with staff  Outcome: Progressing

## 2020-09-24 NOTE — PROGRESS NOTES
[facility]    Labor progress note    Windsor Romberg Patient Status:  Inpatient    1995 MRN [id number]   Location [address]  Attending Onel Gannon MD   Hosp Day # 1 PCP None Pcp     Date of Admission:   Optional Initial Labs     Test Value Reference Range Date Time    TSH        HCV        Pap Smear Negative for intraepithelial lesion or malignancy  Negative for intraepithelial lesion or malignancy 03/27/20 1207    HPV        GC DNA        Chlamydia DNA Chlamydia DNA              Optional Labs     Test Value Reference Range Date Time    HgB A1c 6.0 % <5.7 03/25/20 1403    HGB Electrophoresis        Varicella Zoster        Cystic Fibrosis-Old         Cystic Fibrosis[32] (Required questions in OE to answer •  Misc. Devices (BREAST PUMP) Does not apply Misc, DOUBLE ELECTRIC BREAST PUMP EQUIVALENT TO MEDELA PUMP IN STYLE, Disp: 1 each, Rfl: 0    •  OneTouch Delica Lancets 75N Does not apply Misc, 1 lancet by Finger stick route 4 (four) times daily. , Disp: 1 Aj LEUUR Negative 09/12/2020     Recent Labs   Lab 09/22/20  1934 09/22/20  2253 09/23/20  0207 09/23/20  0526 09/23/20  0844 09/23/20  1132 09/23/20  1437 09/23/20  1639 09/23/20  1837 09/23/20 2037   PGLU 92 87 75 94 90 151* 118* 105* 89 93       Assessme

## 2020-09-24 NOTE — L&D DELIVERY NOTE
[facility]    Vaginal Delivery Note    Peri Nichole Patient Status:  Inpatient    1995 MRN [id number]   Location [address]  Attending Sky Scott MD   Hosp Day # 2 PCP None Pcp     Delivery     Infant

## 2020-09-24 NOTE — PAYOR COMM NOTE
--------------  ADMISSION REVIEW     Payor: Arnaldo Vallejo #:  [id number]  Authorization Number: [id number]    Admit date: 9/22/20  Admit time: [address]       Admitting Physician: Chris Vaughn MD  Attending Physician Smokeless tobacco: Never Used    Alcohol use: Not Currently       Allergies/Medications:    Allergies:   No Known Allergies  Medications:    •  Insulin NPH, Human,, Isophane, 100 UNIT/ML Subcutaneous Suspension, Inject 4 Units into the skin nightly., David Maloney Type 2 diabetes mellitus without complication (HCC)     Obesity affecting pregnancy, antepartum     Type 2 diabetes mellitus in pregnancy, second trimester     Supervision of normal pregnancy     Dehydration, moderate     Pregnancy Placenta  Date/Time of Delivery: 9/24/2020  1:39 AM   Delivery: spontaneous  Placenta to Pathology: no  Cord Gases Submitted: no  Cord Blood/Tissue Collection: yes  Cord Complications: none  Sponge and Needle Counts:  Verified     Maternal Anesthesia: epid 9/24/2020 1249 Given 600 mg Oral David Christopher RN    9/24/2020 6049 Given 600 mg Oral Robert Boyd RN      lactated ringers IV bolus 1,000 mL     Date Action Dose Route User    9/23/2020 2137 New Bag 1,000 mL Intravenous Robert Boyd RN      lacta

## 2020-09-24 NOTE — PROGRESS NOTES
This RN endorsed pt to [place]. This RN notified last accucheck completed at 1830 and was noted to be 85 mg/dl.

## 2020-09-24 NOTE — ANESTHESIA POSTPROCEDURE EVALUATION
Patient: Laina Solorzano    Procedure Summary     Date: 09/23/20 Room / Location:     Anesthesia Start: 2153 Anesthesia Stop: 09/24/20 0139    Procedure: LABOR ANALGESIA Diagnosis:     Scheduled Providers:  Anesthesiologist: Shilpi Edward MD    Anesthesia T

## 2020-09-24 NOTE — PROGRESS NOTES
Anesthesia Progress Note:    Ms. Jerel Curling initially stated she was comfortable with her epidural placed earlier this evening. She then received an epidural bolus, due to complaints of pelvic pain. She later stated she was not feeling any pain relief.

## 2020-09-24 NOTE — ANESTHESIA PROCEDURE NOTES
Labor Analgesia    Date/Time: 9/23/2020 9:53 PM  Performed by: Chi Phipps MD  Authorized by: Chi Phipps MD       General Information and Staff    Start Time:  9/23/2020 9:53 PM  End Time:  9/23/2020 10:15 PM  Anesthesiologist:  Prateek Nguyen

## 2020-09-24 NOTE — ANESTHESIA PREPROCEDURE EVALUATION
Anesthesia PreOp Note    HPI:     Ирина Fiore is a 23year old female who presents for preoperative consultation requested by: Dr. Karina Ponce    Date of Surgery: 9/23/2020    Labor epidural  Indication: Active labor     Relevant Problems   No relevant active pro Sky Scott MD, Last Rate: 4 mL/hr at 09/23/20 1027, 4 patrick-units/min at 09/23/20 1027    •  oxyTOCIN (PITOCIN) 30 units/ 500 ml 0.9% NS premix infusion, 0.5-20 patrick-units/min, Intravenous, Continuous, Jesus Tejeda MD, Last Rate: 10 mL/hr at 0 Continuous, Papa Castellanos MD, Last Rate: 125 mL/hr at 09/23/20 1276    •  dextrose 5 % /lactated ringers infusion, , Intravenous, Continuous, Jesus Tejeda MD    •  fentaNYL citrate (SUBLIMAZE) 0.05 MG/ML injection 50 mcg, 50 mcg, Intravenous, Q30 file        Forced sexual activity: Not on file    Other Topics      Concerns:        Not on file    Social History Narrative      Not on file      Available pre-op labs reviewed.   Lab Results   Component Value Date    WBC 10.1 09/22/2020    RBC 4.41 09/22 block, difficulty breathing, infection, bleeding, nerve damage, paralysis, death. The patient desires the proposed neuraxial anesthetic as planned. All anesthetic questions answered.      Informed Consent Plan and Risks Discussed With:  Patient  Discusse

## 2020-09-24 NOTE — ANESTHESIA PROCEDURE NOTES
Labor Analgesia    Date/Time: 9/24/2020 12:05 AM  Performed by: Adrianne Cedeno MD  Authorized by: Adrianne Cedeno MD       General Information and Staff    Start Time:  9/24/2020 12:05 AM  End Time:  9/24/2020 12:15 AM  Anesthesiologist:  Michel Ballard

## 2020-09-25 VITALS
DIASTOLIC BLOOD PRESSURE: 70 MMHG | WEIGHT: 218 LBS | HEIGHT: 62 IN | SYSTOLIC BLOOD PRESSURE: 120 MMHG | RESPIRATION RATE: 14 BRPM | BODY MASS INDEX: 40.12 KG/M2 | HEART RATE: 86 BPM | TEMPERATURE: 98 F | OXYGEN SATURATION: 100 %

## 2020-09-25 RX ORDER — IBUPROFEN 600 MG/1
600 TABLET ORAL EVERY 6 HOURS PRN
Qty: 30 TABLET | Refills: 0 | Status: SHIPPED | OUTPATIENT
Start: 2020-09-25

## 2020-09-25 NOTE — DISCHARGE SUMMARY
[facility] [place] [facility]    Discharge Summary    Diane Verma Patient Status:  Inpatient    1995 MRN [id number]   Location [facility] 3SE Attending Caio Lepe MD   Hosp Day # 3 PCP None Pcp     Date of Admission: 2020   Date of D Follow-up Information     Mario Dasilva MD. Schedule an appointment as soon as possible for a visit in 6 weeks. Specialty: OBSTETRICS & GYNECOLOGY  Why: postpartum exam  Contact information:  Liz[place] Anaya  [phone]

## 2020-09-25 NOTE — PLAN OF CARE
Problem: PAIN - ADULT  Goal: Verbalizes/displays adequate comfort level or patient's stated pain goal  Description: INTERVENTIONS:  - Encourage pt to monitor pain and request assistance  - Assess pain using appropriate pain scale  - Administer analgesics Progressing  Goal: Optimize infant feeding at the breast  Description: INTERVENTIONS:  - Initiate breast feeding within first hour after birth. - Monitor effectiveness of current breast feeding efforts.   - Assess support systems available to mother/famil interactions. - Assess caregiver's emotional status and coping mechanisms. - Encourage caregiver to participate in  daily care. - Assess support systems available to mother/family.  - Provide /case management support as needed.   Carmella Patel

## 2020-09-25 NOTE — LACTATION NOTE
This note was copied from a baby's chart.   LACTATION NOTE - INFANT    Evaluation Type  Evaluation Type: Inpatient    Problems & Assessment  Problems: comment/detail: denies problems  Infant Assessment: Minimal hunger cues present;Skin color: pink or approp

## 2020-09-25 NOTE — PLAN OF CARE
Problem: Patient Centered Care  Goal: Patient preferences are identified and integrated in the patient's plan of care  Description: Interventions:  - What would you like us to know as we care for you? Baby girl- Anabel Dixon. Plans to breast and bottle feed.   -

## 2020-09-30 ENCOUNTER — TELEPHONE (OUTPATIENT)
Dept: OBGYN UNIT | Facility: HOSPITAL | Age: 25
End: 2020-09-30

## 2020-10-08 ENCOUNTER — TELEPHONE (OUTPATIENT)
Dept: OBGYN UNIT | Facility: HOSPITAL | Age: 25
End: 2020-10-08

## 2020-10-12 NOTE — ADDENDUM NOTE
Encounter addended by:  Eleazar Gosselin, MD on: 10/12/2020 3:12 PM   Actions taken: Clinical Note Signed, Charge Capture section accepted

## 2020-11-03 ENCOUNTER — POSTPARTUM (OUTPATIENT)
Dept: OBGYN CLINIC | Facility: CLINIC | Age: 25
End: 2020-11-03
Payer: MEDICAID

## 2020-11-03 VITALS — DIASTOLIC BLOOD PRESSURE: 78 MMHG | WEIGHT: 204.63 LBS | BODY MASS INDEX: 37 KG/M2 | SYSTOLIC BLOOD PRESSURE: 108 MMHG

## 2020-11-03 DIAGNOSIS — Z86.32 HISTORY OF GESTATIONAL DIABETES: ICD-10-CM

## 2020-11-03 DIAGNOSIS — Z30.42 DEPOT CONTRACEPTION: ICD-10-CM

## 2020-11-03 DIAGNOSIS — Z30.09 ENCOUNTER FOR COUNSELING REGARDING CONTRACEPTION: Primary | ICD-10-CM

## 2020-11-03 PROCEDURE — 3074F SYST BP LT 130 MM HG: CPT | Performed by: OBSTETRICS & GYNECOLOGY

## 2020-11-03 PROCEDURE — 99024 POSTOP FOLLOW-UP VISIT: CPT | Performed by: OBSTETRICS & GYNECOLOGY

## 2020-11-03 PROCEDURE — 3078F DIAST BP <80 MM HG: CPT | Performed by: OBSTETRICS & GYNECOLOGY

## 2020-11-03 PROCEDURE — 81025 URINE PREGNANCY TEST: CPT | Performed by: OBSTETRICS & GYNECOLOGY

## 2020-11-03 PROCEDURE — 96372 THER/PROPH/DIAG INJ SC/IM: CPT | Performed by: OBSTETRICS & GYNECOLOGY

## 2020-11-03 NOTE — PROGRESS NOTES
HPI:   Rosalinda Lee is a 23year old female who presents for a pp visit and for contraception. Pt counseled, pt wants depo provera. Hx of diabetes in pregnancy,  2 hr gtt advised , order entered.       Wt Readings from Last 6 Encounters:  11/03/20 : 204 lb otherwise  SKIN: denies any unusual skin lesions  EYES:denies blurred vision or double vision  HEENT: denies nasal congestion, sinus pain or ST  LUNGS: denies shortness of breath with exertion  CARDIOVASCULAR: denies chest pain on exertion  GI: denies abdo

## 2020-12-01 ENCOUNTER — TELEPHONE (OUTPATIENT)
Dept: OBGYN CLINIC | Facility: CLINIC | Age: 25
End: 2020-12-01

## 2020-12-02 NOTE — TELEPHONE ENCOUNTER
Please look into why I am getting messages regarding this patient's blood sugar results,  She has been delivered already.   The messages have always been empty

## 2021-01-25 ENCOUNTER — TELEPHONE (OUTPATIENT)
Dept: OBGYN CLINIC | Facility: CLINIC | Age: 26
End: 2021-01-25

## 2021-01-25 ENCOUNTER — NURSE ONLY (OUTPATIENT)
Dept: OBGYN CLINIC | Facility: CLINIC | Age: 26
End: 2021-01-25
Payer: MEDICAID

## 2021-01-25 VITALS — DIASTOLIC BLOOD PRESSURE: 80 MMHG | SYSTOLIC BLOOD PRESSURE: 120 MMHG

## 2021-01-25 DIAGNOSIS — Z30.013 ENCOUNTER FOR INITIAL PRESCRIPTION OF INJECTABLE CONTRACEPTIVE: Primary | ICD-10-CM

## 2021-01-25 LAB — CONTROL LINE PRESENT WITH A CLEAR BACKGROUND (YES/NO): YES YES/NO

## 2021-01-25 PROCEDURE — 3074F SYST BP LT 130 MM HG: CPT | Performed by: OBSTETRICS & GYNECOLOGY

## 2021-01-25 PROCEDURE — 81025 URINE PREGNANCY TEST: CPT | Performed by: OBSTETRICS & GYNECOLOGY

## 2021-01-25 PROCEDURE — 96372 THER/PROPH/DIAG INJ SC/IM: CPT | Performed by: OBSTETRICS & GYNECOLOGY

## 2021-01-25 PROCEDURE — 3079F DIAST BP 80-89 MM HG: CPT | Performed by: OBSTETRICS & GYNECOLOGY

## 2021-01-25 NOTE — TELEPHONE ENCOUNTER
Spoke with pharmacists and pts insurance will only cover One Touch. Pt Name and  verified. Patient informed and verbalized understanding.

## 2021-01-25 NOTE — PROGRESS NOTES
Depo provera 104 mg subcutaneous administered to the pt in the left upper arm. Pt tolerated well. No adverse effects.

## 2021-02-01 ENCOUNTER — TELEPHONE (OUTPATIENT)
Dept: OBGYN CLINIC | Facility: CLINIC | Age: 26
End: 2021-02-01

## 2021-02-01 NOTE — TELEPHONE ENCOUNTER
Patient calling stating she is going out of the country to [facility] and needs a letter from Dr. Karissa Deras that she needs to transport her glucose meter.      She will pick it up at office and is leaving this Friday

## 2021-02-02 NOTE — TELEPHONE ENCOUNTER
Letter generated per [facility] approval. Patient name and  verified. Patient informed letter is ready for  or she can print letter via [facility] [facility] [address]. Patient verbalized understanding.

## 2021-03-22 ENCOUNTER — TELEPHONE (OUTPATIENT)
Dept: OBGYN CLINIC | Facility: CLINIC | Age: 26
End: 2021-03-22

## 2021-03-22 NOTE — TELEPHONE ENCOUNTER
Bariatrics dept states they received an authorization request by mistake from pharmacy for One Touch, can call the pharmacy to f/u

## 2021-04-23 ENCOUNTER — TELEPHONE (OUTPATIENT)
Dept: OBGYN CLINIC | Facility: CLINIC | Age: 26
End: 2021-04-23

## 2021-04-23 DIAGNOSIS — Z30.42 ENCOUNTER FOR SURVEILLANCE OF INJECTABLE CONTRACEPTIVE: Primary | ICD-10-CM

## 2021-04-23 NOTE — TELEPHONE ENCOUNTER
Pt will be coming to receive her Depo 104 injection tomorrow 4/24/2021. Order for depo to be administered is needed.  pls advise

## 2021-04-24 ENCOUNTER — NURSE ONLY (OUTPATIENT)
Dept: OBGYN CLINIC | Facility: CLINIC | Age: 26
End: 2021-04-24
Payer: MEDICAID

## 2021-04-24 VITALS — DIASTOLIC BLOOD PRESSURE: 70 MMHG | SYSTOLIC BLOOD PRESSURE: 112 MMHG

## 2021-04-24 DIAGNOSIS — Z30.42 DEPO-PROVERA CONTRACEPTIVE STATUS: Primary | ICD-10-CM

## 2021-04-24 PROCEDURE — 81025 URINE PREGNANCY TEST: CPT | Performed by: OBSTETRICS & GYNECOLOGY

## 2021-04-24 PROCEDURE — 3078F DIAST BP <80 MM HG: CPT | Performed by: OBSTETRICS & GYNECOLOGY

## 2021-04-24 PROCEDURE — 3074F SYST BP LT 130 MM HG: CPT | Performed by: OBSTETRICS & GYNECOLOGY

## 2021-04-24 NOTE — PROGRESS NOTES
Pt Name and  verified. 23  1137   BP: 112/70       Pt of __ASJ_____ here for on-time Depo __104__ injection. Vitals WNL. Injection well tolerated. No adverse reaction noted.         Last depo injection: 2021      Pt advised to return for

## 2021-05-22 DIAGNOSIS — O24.112 TYPE 2 DIABETES MELLITUS IN PREGNANCY, SECOND TRIMESTER: ICD-10-CM

## 2021-05-22 RX ORDER — LANCETS 33 GAUGE
EACH MISCELLANEOUS
Qty: 100 EACH | Refills: 0 | Status: SHIPPED | OUTPATIENT
Start: 2021-05-22 | End: 2021-06-21

## 2021-05-22 RX ORDER — BLOOD SUGAR DIAGNOSTIC
STRIP MISCELLANEOUS
Qty: 100 STRIP | Refills: 2 | Status: SHIPPED | OUTPATIENT
Start: 2021-05-22 | End: 2021-09-24

## 2021-06-21 DIAGNOSIS — O24.112 TYPE 2 DIABETES MELLITUS IN PREGNANCY, SECOND TRIMESTER: ICD-10-CM

## 2021-06-21 RX ORDER — LANCETS 33 GAUGE
EACH MISCELLANEOUS
Qty: 100 EACH | Refills: 0 | Status: SHIPPED | OUTPATIENT
Start: 2021-06-21 | End: 2021-08-30

## 2021-07-27 PROBLEM — O99.210 OBESITY AFFECTING PREGNANCY, ANTEPARTUM: Status: RESOLVED | Noted: 2020-03-27 | Resolved: 2021-07-27

## 2021-07-27 PROBLEM — O24.112: Status: RESOLVED | Noted: 2020-03-28 | Resolved: 2021-07-27

## 2021-07-27 PROBLEM — O99.210 OBESITY AFFECTING PREGNANCY, ANTEPARTUM (HCC): Status: RESOLVED | Noted: 2020-03-27 | Resolved: 2021-07-27

## 2021-07-27 PROBLEM — O24.112 TYPE 2 DIABETES MELLITUS IN PREGNANCY, SECOND TRIMESTER: Status: RESOLVED | Noted: 2020-03-28 | Resolved: 2021-07-27

## 2021-07-27 PROBLEM — Z34.90 PREGNANCY: Status: RESOLVED | Noted: 2020-09-22 | Resolved: 2021-07-27

## 2021-07-27 PROBLEM — Z34.90 PREGNANCY (HCC): Status: RESOLVED | Noted: 2020-09-22 | Resolved: 2021-07-27

## 2021-07-27 PROBLEM — Z34.90 SUPERVISION OF NORMAL PREGNANCY (HCC): Status: RESOLVED | Noted: 2020-09-04 | Resolved: 2021-07-27

## 2021-07-27 PROBLEM — Z34.90 SUPERVISION OF NORMAL PREGNANCY: Status: RESOLVED | Noted: 2020-09-04 | Resolved: 2021-07-27

## 2021-08-24 ENCOUNTER — TELEPHONE (OUTPATIENT)
Dept: OBGYN CLINIC | Facility: CLINIC | Age: 26
End: 2021-08-24

## 2021-08-24 NOTE — TELEPHONE ENCOUNTER
Patient name and  verified. Patient states she would like nexplanon insert. Advised patient should need a consult before insertion. Patient scheduled with [facility] for 2021. Verbalized understanding.

## 2021-08-29 DIAGNOSIS — O24.112 TYPE 2 DIABETES MELLITUS IN PREGNANCY, SECOND TRIMESTER: ICD-10-CM

## 2021-08-30 ENCOUNTER — OFFICE VISIT (OUTPATIENT)
Dept: OBGYN CLINIC | Facility: CLINIC | Age: 26
End: 2021-08-30
Payer: MEDICAID

## 2021-08-30 VITALS — DIASTOLIC BLOOD PRESSURE: 72 MMHG | SYSTOLIC BLOOD PRESSURE: 113 MMHG

## 2021-08-30 DIAGNOSIS — Z30.09 GENERAL COUNSELING AND ADVICE ON FEMALE CONTRACEPTION: Primary | ICD-10-CM

## 2021-08-30 PROCEDURE — 99213 OFFICE O/P EST LOW 20 MIN: CPT | Performed by: OBSTETRICS & GYNECOLOGY

## 2021-08-30 PROCEDURE — 3078F DIAST BP <80 MM HG: CPT | Performed by: OBSTETRICS & GYNECOLOGY

## 2021-08-30 PROCEDURE — 3074F SYST BP LT 130 MM HG: CPT | Performed by: OBSTETRICS & GYNECOLOGY

## 2021-08-30 RX ORDER — LANCETS 33 GAUGE
EACH MISCELLANEOUS
Qty: 100 EACH | Refills: 0 | Status: SHIPPED | OUTPATIENT
Start: 2021-08-30

## 2021-08-30 NOTE — PROGRESS NOTES
[facility]  Obstetrics and Gynecology  Focused Gynecology Problem Exam  Zarina Saunders MD    Jeremy Walton is a 27year old female presenting for Consult (discuss nexplanon)  .     HPI:   Patient presents with:  Consult: discuss nexplanon    Patient is h Past Medical History:   Diagnosis Date   • Diabetes mellitus ([facility] [address] 01/11/2020   • Gestational diabetes        No past surgical history on file.     Family History   Problem Relation Age of Onset   • Diabetes Mother    • Diabe (BP Location: Left arm, Patient Position: Sitting)     GENERAL: well developed, well nourished, in no apparent distress  ABDOMEN: Soft, non distended; non tender, no masses     ASSESSMENT:    A: 27 y.o. [id number] here for contraception counseling, currently

## 2021-09-03 ENCOUNTER — OFFICE VISIT (OUTPATIENT)
Dept: OBGYN CLINIC | Facility: CLINIC | Age: 26
End: 2021-09-03
Payer: MEDICAID

## 2021-09-03 VITALS — BODY MASS INDEX: 39 KG/M2 | WEIGHT: 210.5 LBS | DIASTOLIC BLOOD PRESSURE: 63 MMHG | SYSTOLIC BLOOD PRESSURE: 114 MMHG

## 2021-09-03 DIAGNOSIS — Z01.812 PRE-PROCEDURAL LABORATORY EXAMINATION: ICD-10-CM

## 2021-09-03 DIAGNOSIS — Z30.09 ENCOUNTER FOR COUNSELING REGARDING CONTRACEPTION: Primary | ICD-10-CM

## 2021-09-03 DIAGNOSIS — Z30.42 DEPOT CONTRACEPTION: ICD-10-CM

## 2021-09-03 LAB
CONTROL LINE PRESENT WITH A CLEAR BACKGROUND (YES/NO): YES YES/NO
KIT LOT #: 5077 NUMERIC
PREGNANCY TEST, URINE: NEGATIVE

## 2021-09-03 PROCEDURE — 3078F DIAST BP <80 MM HG: CPT | Performed by: OBSTETRICS & GYNECOLOGY

## 2021-09-03 PROCEDURE — 99213 OFFICE O/P EST LOW 20 MIN: CPT | Performed by: OBSTETRICS & GYNECOLOGY

## 2021-09-03 PROCEDURE — 3074F SYST BP LT 130 MM HG: CPT | Performed by: OBSTETRICS & GYNECOLOGY

## 2021-09-03 PROCEDURE — 96372 THER/PROPH/DIAG INJ SC/IM: CPT | Performed by: OBSTETRICS & GYNECOLOGY

## 2021-09-03 PROCEDURE — 81025 URINE PREGNANCY TEST: CPT | Performed by: OBSTETRICS & GYNECOLOGY

## 2021-09-03 NOTE — PROGRESS NOTES
HPI:   Shalom Carrera is a 27year old female who presents for a contraception consult.    Pt counseled extensively on contraception options, pt declined mirena iud since she did not like how it felt,   Pt considered her options and wants Depo Provera 104 subcu Used    Vaping Use      Vaping Use: Never assessed    Alcohol use: Not Currently    Drug use: Not Currently           REVIEW OF SYSTEMS:   GENERAL: feels well otherwise  SKIN: denies any unusual skin lesions  EYES:denies blurred vision or double vision  HE visit.

## 2021-09-19 DIAGNOSIS — O24.112 TYPE 2 DIABETES MELLITUS IN PREGNANCY, SECOND TRIMESTER: ICD-10-CM

## 2021-09-22 RX ORDER — BLOOD SUGAR DIAGNOSTIC
STRIP MISCELLANEOUS
Qty: 100 STRIP | Refills: 2 | OUTPATIENT
Start: 2021-09-22

## 2021-09-23 ENCOUNTER — PATIENT MESSAGE (OUTPATIENT)
Dept: FAMILY MEDICINE CLINIC | Facility: CLINIC | Age: 26
End: 2021-09-23

## 2021-09-23 DIAGNOSIS — O24.112 TYPE 2 DIABETES MELLITUS IN PREGNANCY, SECOND TRIMESTER: ICD-10-CM

## 2021-09-24 RX ORDER — BLOOD SUGAR DIAGNOSTIC
STRIP MISCELLANEOUS
Qty: 1 EACH | Refills: 1 | Status: SHIPPED | OUTPATIENT
Start: 2021-09-24

## 2021-09-24 NOTE — TELEPHONE ENCOUNTER
Received second prescription request from Anthony Hay.     [employer] ULTRA In Vitro Strip, USE AS DIRECTED TO TEST THREE TIMES DAILY, Disp: 100 strip, Rfl: 2

## 2021-12-04 ENCOUNTER — TELEPHONE (OUTPATIENT)
Dept: OBGYN CLINIC | Facility: CLINIC | Age: 26
End: 2021-12-04

## 2021-12-04 NOTE — TELEPHONE ENCOUNTER
Pt Name and  verified. Pt is calling because she is requesting a refill for her metformin medication. Pt has a PCP but wanted to know if [facility] would be able to prescribe the metformin. Advised that it would be best for her to get it through her PCP.  Pt

## 2021-12-06 DIAGNOSIS — O24.112 TYPE 2 DIABETES MELLITUS IN PREGNANCY, SECOND TRIMESTER: ICD-10-CM

## 2021-12-06 RX ORDER — LANCETS 33 GAUGE
EACH MISCELLANEOUS
Qty: 100 EACH | Refills: 0 | OUTPATIENT
Start: 2021-12-06

## 2021-12-17 ENCOUNTER — LAB ENCOUNTER (OUTPATIENT)
Dept: LAB | Age: 26
End: 2021-12-17
Attending: FAMILY MEDICINE
Payer: MEDICAID

## 2021-12-17 ENCOUNTER — OFFICE VISIT (OUTPATIENT)
Dept: FAMILY MEDICINE CLINIC | Facility: CLINIC | Age: 26
End: 2021-12-17
Payer: MEDICAID

## 2021-12-17 VITALS
WEIGHT: 213 LBS | HEART RATE: 90 BPM | DIASTOLIC BLOOD PRESSURE: 69 MMHG | TEMPERATURE: 98 F | BODY MASS INDEX: 36.37 KG/M2 | SYSTOLIC BLOOD PRESSURE: 105 MMHG | HEIGHT: 64 IN

## 2021-12-17 DIAGNOSIS — E11.9 TYPE 2 DIABETES MELLITUS WITHOUT COMPLICATION, WITHOUT LONG-TERM CURRENT USE OF INSULIN (HCC): ICD-10-CM

## 2021-12-17 DIAGNOSIS — E11.9 TYPE 2 DIABETES MELLITUS WITHOUT COMPLICATION, WITHOUT LONG-TERM CURRENT USE OF INSULIN (HCC): Primary | ICD-10-CM

## 2021-12-17 PROBLEM — E66.01 MORBID (SEVERE) OBESITY DUE TO EXCESS CALORIES (HCC): Status: ACTIVE | Noted: 2021-12-17

## 2021-12-17 PROCEDURE — 82043 UR ALBUMIN QUANTITATIVE: CPT

## 2021-12-17 PROCEDURE — 82570 ASSAY OF URINE CREATININE: CPT

## 2021-12-17 PROCEDURE — 99204 OFFICE O/P NEW MOD 45 MIN: CPT | Performed by: FAMILY MEDICINE

## 2021-12-17 PROCEDURE — 3052F HG A1C>EQUAL 8.0%<EQUAL 9.0%: CPT | Performed by: FAMILY MEDICINE

## 2021-12-17 PROCEDURE — 36415 COLL VENOUS BLD VENIPUNCTURE: CPT

## 2021-12-17 PROCEDURE — 3074F SYST BP LT 130 MM HG: CPT | Performed by: FAMILY MEDICINE

## 2021-12-17 PROCEDURE — 3078F DIAST BP <80 MM HG: CPT | Performed by: FAMILY MEDICINE

## 2021-12-17 PROCEDURE — 3008F BODY MASS INDEX DOCD: CPT | Performed by: FAMILY MEDICINE

## 2021-12-17 PROCEDURE — 80053 COMPREHEN METABOLIC PANEL: CPT

## 2021-12-17 PROCEDURE — 83036 HEMOGLOBIN GLYCOSYLATED A1C: CPT

## 2021-12-17 PROCEDURE — 3061F NEG MICROALBUMINURIA REV: CPT | Performed by: FAMILY MEDICINE

## 2021-12-17 NOTE — PROGRESS NOTES
Patient presents with:  Establish Care  Diabetes  Referral: requesting referral for gyne for nexplanon    HPI:   Robby Galaviz is a 27year old female who presents to clinic to establish care.   Patient has a history of gestational diabetes, has been taking Me

## 2021-12-21 ENCOUNTER — OFFICE VISIT (OUTPATIENT)
Dept: OBGYN CLINIC | Facility: CLINIC | Age: 26
End: 2021-12-21
Payer: MEDICAID

## 2021-12-21 VITALS — SYSTOLIC BLOOD PRESSURE: 121 MMHG | DIASTOLIC BLOOD PRESSURE: 67 MMHG | WEIGHT: 213 LBS | BODY MASS INDEX: 37 KG/M2

## 2021-12-21 DIAGNOSIS — Z30.017 NEXPLANON INSERTION: Primary | ICD-10-CM

## 2021-12-21 DIAGNOSIS — Z01.812 PRE-PROCEDURAL LABORATORY EXAMINATION: ICD-10-CM

## 2021-12-21 PROCEDURE — 11981 INSERTION DRUG DLVR IMPLANT: CPT | Performed by: OBSTETRICS & GYNECOLOGY

## 2021-12-21 PROCEDURE — 3078F DIAST BP <80 MM HG: CPT | Performed by: OBSTETRICS & GYNECOLOGY

## 2021-12-21 PROCEDURE — 81025 URINE PREGNANCY TEST: CPT | Performed by: OBSTETRICS & GYNECOLOGY

## 2021-12-21 PROCEDURE — 3074F SYST BP LT 130 MM HG: CPT | Performed by: OBSTETRICS & GYNECOLOGY

## 2021-12-22 NOTE — PROCEDURES
Nexplanon Insertion    Pregnancy Results: negative from urine test   Birth control method(s) used: Depo-Provera, last injection 9/3/2021  date last used: 9/3/2021  Consent was obtained from the patient.   Pt counseled on side effects of Nexplanon including

## 2021-12-28 ENCOUNTER — OFFICE VISIT (OUTPATIENT)
Dept: FAMILY MEDICINE CLINIC | Facility: CLINIC | Age: 26
End: 2021-12-28
Payer: MEDICAID

## 2021-12-28 VITALS
TEMPERATURE: 98 F | BODY MASS INDEX: 35.68 KG/M2 | SYSTOLIC BLOOD PRESSURE: 115 MMHG | WEIGHT: 209 LBS | DIASTOLIC BLOOD PRESSURE: 72 MMHG | HEIGHT: 64 IN | HEART RATE: 93 BPM

## 2021-12-28 DIAGNOSIS — Z23 NEEDS FLU SHOT: ICD-10-CM

## 2021-12-28 DIAGNOSIS — E11.9 TYPE 2 DIABETES MELLITUS WITHOUT COMPLICATION, WITHOUT LONG-TERM CURRENT USE OF INSULIN (HCC): Primary | ICD-10-CM

## 2021-12-28 PROBLEM — E11.65 TYPE 2 DIABETES MELLITUS WITH HYPERGLYCEMIA (HCC): Status: ACTIVE | Noted: 2021-12-28

## 2021-12-28 PROCEDURE — 3008F BODY MASS INDEX DOCD: CPT | Performed by: FAMILY MEDICINE

## 2021-12-28 PROCEDURE — 90686 IIV4 VACC NO PRSV 0.5 ML IM: CPT | Performed by: FAMILY MEDICINE

## 2021-12-28 PROCEDURE — 3078F DIAST BP <80 MM HG: CPT | Performed by: FAMILY MEDICINE

## 2021-12-28 PROCEDURE — 90471 IMMUNIZATION ADMIN: CPT | Performed by: FAMILY MEDICINE

## 2021-12-28 PROCEDURE — 99214 OFFICE O/P EST MOD 30 MIN: CPT | Performed by: FAMILY MEDICINE

## 2021-12-28 PROCEDURE — 3074F SYST BP LT 130 MM HG: CPT | Performed by: FAMILY MEDICINE

## 2021-12-28 RX ORDER — GLIPIZIDE 5 MG/1
5 TABLET ORAL
Qty: 90 TABLET | Refills: 0 | Status: SHIPPED | OUTPATIENT
Start: 2021-12-28 | End: 2022-01-06

## 2021-12-28 NOTE — PROGRESS NOTES
Patient presents with: Follow - Up: DM    HPI:   Ирина Fiore is a 27year old female who presents to clinic for lab work follow-up. Patient with newer diagnosis of type 2 diabetes.   Was aware that she had gestational diabetes and was taking Metformin duri

## 2022-01-06 ENCOUNTER — OFFICE VISIT (OUTPATIENT)
Dept: FAMILY MEDICINE CLINIC | Facility: CLINIC | Age: 27
End: 2022-01-06
Payer: MEDICAID

## 2022-01-06 VITALS
BODY MASS INDEX: 35.85 KG/M2 | DIASTOLIC BLOOD PRESSURE: 83 MMHG | HEIGHT: 64 IN | HEART RATE: 74 BPM | WEIGHT: 210 LBS | SYSTOLIC BLOOD PRESSURE: 138 MMHG | TEMPERATURE: 98 F

## 2022-01-06 DIAGNOSIS — Z00.00 ENCOUNTER FOR ANNUAL HEALTH EXAMINATION: Primary | ICD-10-CM

## 2022-01-06 DIAGNOSIS — E11.65 TYPE 2 DIABETES MELLITUS WITH HYPERGLYCEMIA, WITHOUT LONG-TERM CURRENT USE OF INSULIN (HCC): ICD-10-CM

## 2022-01-06 DIAGNOSIS — E66.01 MORBID OBESITY (HCC): ICD-10-CM

## 2022-01-06 PROCEDURE — 3008F BODY MASS INDEX DOCD: CPT | Performed by: FAMILY MEDICINE

## 2022-01-06 PROCEDURE — 3075F SYST BP GE 130 - 139MM HG: CPT | Performed by: FAMILY MEDICINE

## 2022-01-06 PROCEDURE — 99395 PREV VISIT EST AGE 18-39: CPT | Performed by: FAMILY MEDICINE

## 2022-01-06 PROCEDURE — 3079F DIAST BP 80-89 MM HG: CPT | Performed by: FAMILY MEDICINE

## 2022-01-06 RX ORDER — GLIPIZIDE 5 MG/1
5 TABLET ORAL
Qty: 90 TABLET | Refills: 0 | Status: SHIPPED | OUTPATIENT
Start: 2022-01-06

## 2022-01-07 NOTE — PROGRESS NOTES
HPI:   Soumya Hawk is a 27year old female who presents for a complete physical exam.    Work: Works in a factory  Social: Lives with mother and children  Diet: eats home cooked meals   Exercise: active at works    GYN history:   LMP: 2022  Periods • Diabetes Maternal Grandfather       Social History:   Social History    Tobacco Use      Smoking status: Never Smoker      Smokeless tobacco: Never Used    Vaping Use      Vaping Use: Not on file    Alcohol use: Not Currently    Drug use: Not Currently intake, saturated fat and cholesterol, caloric balance, sufficient intake of fresh fruits, vegetables, fiber, calcium, iron, and 1 mg of folate supplement per day (for females capable of pregnancy).    - Exercise: Stressed the importance of regular exercise

## 2022-08-18 ENCOUNTER — OFFICE VISIT (OUTPATIENT)
Dept: FAMILY MEDICINE CLINIC | Facility: CLINIC | Age: 27
End: 2022-08-18
Payer: MEDICAID

## 2022-08-18 ENCOUNTER — TELEPHONE (OUTPATIENT)
Dept: FAMILY MEDICINE CLINIC | Facility: CLINIC | Age: 27
End: 2022-08-18

## 2022-08-18 VITALS
WEIGHT: 204 LBS | HEIGHT: 64 IN | SYSTOLIC BLOOD PRESSURE: 127 MMHG | BODY MASS INDEX: 34.83 KG/M2 | HEART RATE: 87 BPM | DIASTOLIC BLOOD PRESSURE: 76 MMHG

## 2022-08-18 DIAGNOSIS — E11.65 TYPE 2 DIABETES MELLITUS WITH HYPERGLYCEMIA, WITHOUT LONG-TERM CURRENT USE OF INSULIN (HCC): Primary | ICD-10-CM

## 2022-08-18 PROBLEM — E66.01 MORBID (SEVERE) OBESITY DUE TO EXCESS CALORIES (HCC): Status: ACTIVE | Noted: 2022-08-18

## 2022-08-18 LAB
CARTRIDGE LOT#: ABNORMAL NUMERIC
HEMOGLOBIN A1C: 7.9 % (ref 4.3–5.6)

## 2022-08-18 PROCEDURE — 3051F HG A1C>EQUAL 7.0%<8.0%: CPT | Performed by: FAMILY MEDICINE

## 2022-08-18 PROCEDURE — 83036 HEMOGLOBIN GLYCOSYLATED A1C: CPT | Performed by: FAMILY MEDICINE

## 2022-08-18 PROCEDURE — 99214 OFFICE O/P EST MOD 30 MIN: CPT | Performed by: FAMILY MEDICINE

## 2022-08-18 PROCEDURE — 3008F BODY MASS INDEX DOCD: CPT | Performed by: FAMILY MEDICINE

## 2022-08-18 PROCEDURE — 3074F SYST BP LT 130 MM HG: CPT | Performed by: FAMILY MEDICINE

## 2022-08-18 PROCEDURE — 3078F DIAST BP <80 MM HG: CPT | Performed by: FAMILY MEDICINE

## 2022-08-18 RX ORDER — BLOOD SUGAR DIAGNOSTIC
STRIP MISCELLANEOUS
Qty: 100 EACH | Refills: 3 | Status: SHIPPED | OUTPATIENT
Start: 2022-08-18

## 2022-08-18 RX ORDER — LANCETS 33 GAUGE
1 EACH MISCELLANEOUS 3 TIMES DAILY
Qty: 100 EACH | Refills: 3 | Status: SHIPPED | OUTPATIENT
Start: 2022-08-18

## 2022-08-18 RX ORDER — BLOOD-GLUCOSE METER
1 EACH MISCELLANEOUS ONCE
Qty: 1 KIT | Refills: 0 | Status: SHIPPED | OUTPATIENT
Start: 2022-08-18 | End: 2022-08-18

## 2023-02-15 ENCOUNTER — OFFICE VISIT (OUTPATIENT)
Dept: OBGYN CLINIC | Facility: CLINIC | Age: 28
End: 2023-02-15

## 2023-02-15 ENCOUNTER — TELEPHONE (OUTPATIENT)
Dept: OBGYN CLINIC | Facility: CLINIC | Age: 28
End: 2023-02-15

## 2023-02-15 VITALS — SYSTOLIC BLOOD PRESSURE: 123 MMHG | WEIGHT: 214.38 LBS | BODY MASS INDEX: 37 KG/M2 | DIASTOLIC BLOOD PRESSURE: 77 MMHG

## 2023-02-15 DIAGNOSIS — Z30.46 NEXPLANON REMOVAL: Primary | ICD-10-CM

## 2023-02-15 PROCEDURE — 3074F SYST BP LT 130 MM HG: CPT | Performed by: OBSTETRICS & GYNECOLOGY

## 2023-02-15 PROCEDURE — 11976 REMOVE CONTRACEPTIVE CAPSULE: CPT | Performed by: OBSTETRICS & GYNECOLOGY

## 2023-02-15 PROCEDURE — 3078F DIAST BP <80 MM HG: CPT | Performed by: OBSTETRICS & GYNECOLOGY

## 2023-02-15 NOTE — TELEPHONE ENCOUNTER
Patient name and  verified. Patient scheduled for today with [facility] for nexplanon removal. Aware of scheduling details.

## 2023-02-15 NOTE — PROCEDURES
Nexplanon Removal    Pregnancy Results: negative from urine test   Consent was obtained from the patient. Removal:  1 % lidocaine was injected underneath the tip of the Nexplanon mani that is closest to the left elbow. Nexplanon was located by palpation. 3 mm incision was made at the tip of the mani closest to the elbow. Nexplanon was pushed toward the incision. Nexplanon mani was sharply dissected from the tissue sheath. The entire Nexplanon mani was removed. Steri-Strips were applied to the skin incision. Patient was instructed to remove Steri-Strips and band aid in 5 days. All of the patient's questions were addressed.     Jessie Sue MD, MD  2/15/2023  3:27 PM

## 2023-03-20 PROBLEM — E66.01 MORBID OBESITY (HCC): Status: RESOLVED | Noted: 2021-12-17 | Resolved: 2023-03-20

## 2023-03-20 PROBLEM — Z01.419 WELL WOMAN EXAM WITH ROUTINE GYNECOLOGICAL EXAM: Status: ACTIVE | Noted: 2020-11-03

## 2023-03-21 ENCOUNTER — OFFICE VISIT (OUTPATIENT)
Dept: OBGYN CLINIC | Facility: CLINIC | Age: 28
End: 2023-03-21

## 2023-03-21 VITALS — DIASTOLIC BLOOD PRESSURE: 82 MMHG | WEIGHT: 207.38 LBS | BODY MASS INDEX: 36 KG/M2 | SYSTOLIC BLOOD PRESSURE: 116 MMHG

## 2023-03-21 DIAGNOSIS — E11.65 TYPE 2 DIABETES MELLITUS WITH HYPERGLYCEMIA, WITHOUT LONG-TERM CURRENT USE OF INSULIN (HCC): ICD-10-CM

## 2023-03-21 DIAGNOSIS — Z01.419 WELL WOMAN EXAM WITH ROUTINE GYNECOLOGICAL EXAM: Primary | ICD-10-CM

## 2023-03-21 DIAGNOSIS — Z11.3 ROUTINE SCREENING FOR STI (SEXUALLY TRANSMITTED INFECTION): ICD-10-CM

## 2023-03-21 PROBLEM — Z30.42 DEPOT CONTRACEPTION: Status: RESOLVED | Noted: 2021-09-03 | Resolved: 2023-03-21

## 2023-03-21 PROCEDURE — 3079F DIAST BP 80-89 MM HG: CPT | Performed by: OBSTETRICS & GYNECOLOGY

## 2023-03-21 PROCEDURE — 3074F SYST BP LT 130 MM HG: CPT | Performed by: OBSTETRICS & GYNECOLOGY

## 2023-03-21 PROCEDURE — 99395 PREV VISIT EST AGE 18-39: CPT | Performed by: OBSTETRICS & GYNECOLOGY

## 2023-03-22 LAB
C TRACH DNA SPEC QL NAA+PROBE: NEGATIVE
N GONORRHOEA DNA SPEC QL NAA+PROBE: NEGATIVE

## 2023-03-23 ENCOUNTER — TELEPHONE (OUTPATIENT)
Dept: OBGYN CLINIC | Facility: CLINIC | Age: 28
End: 2023-03-23

## 2023-03-23 NOTE — TELEPHONE ENCOUNTER
Japanese please  Pt has appt on 3/24 for IUD insertion, pt would like to change to a depo shot    Pls advise

## 2023-03-24 ENCOUNTER — OFFICE VISIT (OUTPATIENT)
Dept: OBGYN CLINIC | Facility: CLINIC | Age: 28
End: 2023-03-24

## 2023-03-24 VITALS — DIASTOLIC BLOOD PRESSURE: 76 MMHG | SYSTOLIC BLOOD PRESSURE: 125 MMHG

## 2023-03-24 DIAGNOSIS — Z30.430 ENCOUNTER FOR INSERTION OF COPPER IUD: Primary | ICD-10-CM

## 2023-03-24 DIAGNOSIS — Z30.42 ENCOUNTER FOR SURVEILLANCE OF INJECTABLE CONTRACEPTIVE: ICD-10-CM

## 2023-03-24 LAB
CONTROL LINE PRESENT WITH A CLEAR BACKGROUND (YES/NO): YES YES/NO
PREGNANCY TEST, URINE: NEGATIVE

## 2023-03-24 PROCEDURE — 81025 URINE PREGNANCY TEST: CPT | Performed by: OBSTETRICS & GYNECOLOGY

## 2023-03-24 PROCEDURE — 3078F DIAST BP <80 MM HG: CPT | Performed by: OBSTETRICS & GYNECOLOGY

## 2023-03-24 PROCEDURE — 58300 INSERT INTRAUTERINE DEVICE: CPT | Performed by: OBSTETRICS & GYNECOLOGY

## 2023-03-24 PROCEDURE — 3074F SYST BP LT 130 MM HG: CPT | Performed by: OBSTETRICS & GYNECOLOGY

## 2023-03-24 RX ORDER — COPPER 313.4 MG/1
1 INTRAUTERINE DEVICE INTRAUTERINE ONCE
Status: COMPLETED | OUTPATIENT
Start: 2023-03-24 | End: 2023-03-24

## 2023-03-24 RX ADMIN — COPPER 1 DEVICE: 313.4 INTRAUTERINE DEVICE INTRAUTERINE at 13:38:00

## 2023-04-06 ENCOUNTER — TELEPHONE (OUTPATIENT)
Dept: OBGYN CLINIC | Facility: CLINIC | Age: 28
End: 2023-04-06

## 2023-09-19 ENCOUNTER — OFFICE VISIT (OUTPATIENT)
Dept: FAMILY MEDICINE CLINIC | Facility: CLINIC | Age: 28
End: 2023-09-19

## 2023-09-19 VITALS
HEIGHT: 66 IN | WEIGHT: 208.81 LBS | BODY MASS INDEX: 33.56 KG/M2 | DIASTOLIC BLOOD PRESSURE: 61 MMHG | HEART RATE: 70 BPM | SYSTOLIC BLOOD PRESSURE: 114 MMHG

## 2023-09-19 DIAGNOSIS — E11.65 TYPE 2 DIABETES MELLITUS WITH HYPERGLYCEMIA, WITHOUT LONG-TERM CURRENT USE OF INSULIN (HCC): Primary | ICD-10-CM

## 2023-09-19 PROCEDURE — 99214 OFFICE O/P EST MOD 30 MIN: CPT | Performed by: NURSE PRACTITIONER

## 2023-09-19 PROCEDURE — 3008F BODY MASS INDEX DOCD: CPT | Performed by: NURSE PRACTITIONER

## 2023-09-19 PROCEDURE — 3074F SYST BP LT 130 MM HG: CPT | Performed by: NURSE PRACTITIONER

## 2023-09-19 PROCEDURE — 3078F DIAST BP <80 MM HG: CPT | Performed by: NURSE PRACTITIONER

## 2023-09-21 ENCOUNTER — LAB ENCOUNTER (OUTPATIENT)
Dept: LAB | Age: 28
End: 2023-09-21
Attending: NURSE PRACTITIONER
Payer: MEDICAID

## 2023-09-21 ENCOUNTER — OFFICE VISIT (OUTPATIENT)
Dept: FAMILY MEDICINE CLINIC | Facility: CLINIC | Age: 28
End: 2023-09-21

## 2023-09-21 VITALS
DIASTOLIC BLOOD PRESSURE: 75 MMHG | HEART RATE: 84 BPM | HEIGHT: 66 IN | SYSTOLIC BLOOD PRESSURE: 115 MMHG | BODY MASS INDEX: 33.21 KG/M2 | WEIGHT: 206.63 LBS

## 2023-09-21 DIAGNOSIS — Z00.00 PHYSICAL EXAM: Primary | ICD-10-CM

## 2023-09-21 DIAGNOSIS — E11.65 UNCONTROLLED TYPE 2 DIABETES MELLITUS WITH HYPERGLYCEMIA (HCC): ICD-10-CM

## 2023-09-21 DIAGNOSIS — E11.65 TYPE 2 DIABETES MELLITUS WITH HYPERGLYCEMIA, WITHOUT LONG-TERM CURRENT USE OF INSULIN (HCC): ICD-10-CM

## 2023-09-21 DIAGNOSIS — Z00.00 PHYSICAL EXAM: ICD-10-CM

## 2023-09-21 LAB
ALBUMIN SERPL-MCNC: 3.6 G/DL (ref 3.4–5)
ALBUMIN/GLOB SERPL: 1.1 {RATIO} (ref 1–2)
ALP LIVER SERPL-CCNC: 71 U/L
ALT SERPL-CCNC: 27 U/L
ANION GAP SERPL CALC-SCNC: 6 MMOL/L (ref 0–18)
AST SERPL-CCNC: 17 U/L (ref 15–37)
BILIRUB SERPL-MCNC: 0.4 MG/DL (ref 0.1–2)
BILIRUB UR QL STRIP.AUTO: NEGATIVE
BUN BLD-MCNC: 6 MG/DL (ref 7–18)
CALCIUM BLD-MCNC: 8.9 MG/DL (ref 8.5–10.1)
CHLORIDE SERPL-SCNC: 108 MMOL/L (ref 98–112)
CHOLEST SERPL-MCNC: 215 MG/DL (ref ?–200)
CLARITY UR REFRACT.AUTO: CLEAR
CO2 SERPL-SCNC: 24 MMOL/L (ref 21–32)
CREAT BLD-MCNC: 0.53 MG/DL
CREAT UR-SCNC: 116 MG/DL
EGFRCR SERPLBLD CKD-EPI 2021: 131 ML/MIN/1.73M2 (ref 60–?)
EST. AVERAGE GLUCOSE BLD GHB EST-MCNC: 183 MG/DL (ref 68–126)
FASTING PATIENT LIPID ANSWER: NO
FASTING STATUS PATIENT QL REPORTED: NO
GLOBULIN PLAS-MCNC: 3.3 G/DL (ref 2.8–4.4)
GLUCOSE BLD-MCNC: 155 MG/DL (ref 70–99)
GLUCOSE UR STRIP.AUTO-MCNC: 200 MG/DL
HBA1C MFR BLD: 8 % (ref ?–5.7)
HDLC SERPL-MCNC: 38 MG/DL (ref 40–59)
KETONES UR STRIP.AUTO-MCNC: NEGATIVE MG/DL
LDLC SERPL CALC-MCNC: 142 MG/DL (ref ?–100)
LEUKOCYTE ESTERASE UR QL STRIP.AUTO: 75
MICROALBUMIN UR-MCNC: 0.77 MG/DL
MICROALBUMIN/CREAT 24H UR-RTO: 6.6 UG/MG (ref ?–30)
NITRITE UR QL STRIP.AUTO: NEGATIVE
NONHDLC SERPL-MCNC: 177 MG/DL (ref ?–130)
OSMOLALITY SERPL CALC.SUM OF ELEC: 287 MOSM/KG (ref 275–295)
PH UR STRIP.AUTO: 5.5 [PH] (ref 5–8)
POTASSIUM SERPL-SCNC: 3.8 MMOL/L (ref 3.5–5.1)
PROT SERPL-MCNC: 6.9 G/DL (ref 6.4–8.2)
PROT UR STRIP.AUTO-MCNC: NEGATIVE MG/DL
SODIUM SERPL-SCNC: 138 MMOL/L (ref 136–145)
SP GR UR STRIP.AUTO: 1.01 (ref 1–1.03)
TRIGL SERPL-MCNC: 191 MG/DL (ref 30–149)
UROBILINOGEN UR STRIP.AUTO-MCNC: NORMAL MG/DL
VIT D+METAB SERPL-MCNC: 19.7 NG/ML (ref 30–100)
VLDLC SERPL CALC-MCNC: 36 MG/DL (ref 0–30)

## 2023-09-21 PROCEDURE — 80061 LIPID PANEL: CPT

## 2023-09-21 PROCEDURE — 81001 URINALYSIS AUTO W/SCOPE: CPT

## 2023-09-21 PROCEDURE — 82043 UR ALBUMIN QUANTITATIVE: CPT

## 2023-09-21 PROCEDURE — 80053 COMPREHEN METABOLIC PANEL: CPT

## 2023-09-21 PROCEDURE — 87086 URINE CULTURE/COLONY COUNT: CPT

## 2023-09-21 PROCEDURE — 82306 VITAMIN D 25 HYDROXY: CPT

## 2023-09-21 PROCEDURE — 36415 COLL VENOUS BLD VENIPUNCTURE: CPT

## 2023-09-21 PROCEDURE — 83036 HEMOGLOBIN GLYCOSYLATED A1C: CPT

## 2023-09-21 PROCEDURE — 82570 ASSAY OF URINE CREATININE: CPT

## 2023-09-21 RX ORDER — LANCETS 33 GAUGE
1 EACH MISCELLANEOUS 3 TIMES DAILY
COMMUNITY
Start: 2023-08-25

## 2023-09-21 RX ORDER — BLOOD SUGAR DIAGNOSTIC
STRIP MISCELLANEOUS 3 TIMES DAILY
COMMUNITY
Start: 2023-08-25

## 2023-09-21 RX ORDER — EMPAGLIFLOZIN 25 MG/1
1 TABLET, FILM COATED ORAL DAILY
Qty: 90 TABLET | Refills: 2 | Status: SHIPPED | OUTPATIENT
Start: 2023-09-21

## 2023-09-22 RX ORDER — ERGOCALCIFEROL 1.25 MG/1
50000 CAPSULE ORAL WEEKLY
Qty: 12 CAPSULE | Refills: 3 | Status: SHIPPED | OUTPATIENT
Start: 2023-09-22 | End: 2024-09-21

## 2024-05-03 RX ORDER — BLOOD SUGAR DIAGNOSTIC
STRIP MISCELLANEOUS
Qty: 100 EACH | Refills: 3 | Status: SHIPPED | OUTPATIENT
Start: 2024-05-03

## 2024-05-03 RX ORDER — LANCETS 33 GAUGE
1 EACH MISCELLANEOUS 3 TIMES DAILY
Qty: 100 EACH | Refills: 3 | Status: SHIPPED | OUTPATIENT
Start: 2024-05-03

## 2024-05-03 NOTE — TELEPHONE ENCOUNTER
Refill passed per [facility] protocol.     Requested Prescriptions   Pending Prescriptions Disp Refills    ONETOUCH ULTRA In Vitro Strip  0     Sig: 3 (three) times daily.       Diabetic Supplies Protocol Passed - 2024 10:46 PM        Passed - In person appointment or virtual visit in the past 12 mos or appointment in next 3 mos     Recent Outpatient Visits              7 months ago [facility]    [facility]    7 months ago Type 2 diabetes mellitus with hyperglycemia, without long-term current use of insulin ([facility])    [facility] Melonie Barrera, AFSHAN    Office Visit    1 year ago Encounter for insertion of copper IUD    [facility] Jensen Vera MD    Office Visit    1 year ago Well woman exam with routine gynecological exam    [facility] Jesus Tejeda MD    Office Visit    1 year ago Nexplanon removal    [facility] Abdullahi Pelayo MD    Office Visit                        Lancets (ONETOUCH DELICA PLUS [id number]) Does not apply Misc  0     Si Syringe 3 (three) times daily.       Diabetic Supplies Protocol Passed - 2024 10:46 PM        Passed - In person appointment or virtual visit in the past 12 mos or appointment in next 3 mos     Recent Outpatient Visits              7 months ago [facility] exam    [facility]    Office Visit    7 months ago Type 2 diabetes mellitus with hyperglycemia, without long-term current use of insulin ([facility])    [facility] Melonie Barrera, AFSHAN    Office Visit    1 year ago Encounter for insertion of copper IUD    [facility] Piotr Palm  Jensen BRUNSON MD    Office Visit    1 year ago Well woman exam with routine gynecological exam    [facility] [place] - [facility]/Jesus Hylton MD    Office Visit    1 year ago Nexplanon removal    [facility] - OB/Abdullahi Pineda MD    Office Visit                            Recent Outpatient Visits              7 months ago Physical exam    [facility] [place] Adán Cao MD    Office Visit    7 months ago Type 2 diabetes mellitus with hyperglycemia, without long-term current use of insulin (HCC)    [facility] Melonie Barrera APRN    Office Visit    1 year ago Encounter for insertion of copper IUD    [facility] [place] [facility]Jensen Simon MD    Office Visit    1 year ago Well woman exam with routine gynecological exam    [facility] [place] [facility]/Jesus Hylton MD    Office Visit    1 year ago Nexplanon removal    [facility]/Abdullahi Pineda MD    Office Visit

## 2024-05-03 NOTE — TELEPHONE ENCOUNTER
Please Review. Protocol Failed; No Protocol   Lab Results   Component Value Date     A1C 8.0 (H) 09/21/2023   Recent Visits  Date Type Provider Dept   09/21/23 Office Visit Adán Cao MD [facility]   09/19/23 Office Visit Melonie Barrera APRN [facility]   Showing recent visits within past 540 days with a meds authorizing provider and meeting all other requirements  Future Appointments  No visits were found meeting these conditions.  Showing future appointments within next 150 days with a meds authorizing provider and meeting all other requirements      Requested Prescriptions   Pending Prescriptions Disp Refills    metFORMIN 850 MG Oral Tab 180 tablet 2     Sig: Take 1 tablet (850 mg total) by mouth 2 (two) times daily with meals.       Diabetes Medication Protocol Failed - 5/2/2024 10:46 PM        Failed - Last A1C < 7.5 and within past 6 months     Lab Results   Component Value Date    A1C 8.0 (H) 09/21/2023             Failed - In person appointment or virtual visit in the past 6 mos or appointment in next 3 mos     Recent Outpatient Visits              7 months ago Physical exam    [facility] Ad[facility] Cao MD    Office Visit    7 months ago Type 2 diabetes mellitus with hyperglycemia, without long-term current use of insulin (HCC)    [facility] Melonie Barrera APRN    Office Visit    1 year ago Encounter for insertion of copper IUD    [facility] - [facility]/GY[facility] Jensen Vera MD    Office Visit    1 year ago Well woman exam with routine gynecological exam    [facility] - OB[facility]GY[facility] Jesus Tejeda MD    Office Visit    1 year ago Nexplanon removal    [facility] - OB[facility]GY[facility] Abdullahi Pelayo MD    Office Visit                      Passed - Microalbumin procedure in past 12 months or  taking ACE/ARB        Passed - EGFRCR or GFRNAA > 50     GFR Evaluation  EGFRCR: 131 , resulted on 9/21/2023          Passed - GFR in the past 12 months                 Recent Outpatient Visits              7 months ago Physical exam    [facility]    Office Visit    7 months ago Type 2 diabetes mellitus with hyperglycemia, without long-term current use of insulin (HCC)    [facility]    Office Visit    1 year ago Encounter for insertion of copper IUD    [facility] Jensen Vera MD    Office Visit    1 year ago Well woman exam with routine gynecological exam    [facility] - OB/GYN Jesus Tejeda MD    Office Visit    1 year ago Nexplanon removal    [facility] - [facility]GY[facility] Abdullahi Pelayo MD    Office Visit

## 2024-06-18 ENCOUNTER — HOSPITAL ENCOUNTER (OUTPATIENT)
Age: 29
Discharge: ANOTHER HOSPITAL | End: 2024-06-18

## 2024-06-18 ENCOUNTER — APPOINTMENT (OUTPATIENT)
Dept: CT IMAGING | Facility: HOSPITAL | Age: 29
End: 2024-06-18
Attending: EMERGENCY MEDICINE

## 2024-06-18 ENCOUNTER — HOSPITAL ENCOUNTER (EMERGENCY)
Facility: HOSPITAL | Age: 29
Discharge: HOME | End: 2024-06-18
Attending: EMERGENCY MEDICINE

## 2024-06-18 ENCOUNTER — OFFICE VISIT (OUTPATIENT)
Dept: FAMILY MEDICINE CLINIC | Facility: CLINIC | Age: 29
End: 2024-06-18

## 2024-06-18 VITALS
HEART RATE: 66 BPM | RESPIRATION RATE: 16 BRPM | TEMPERATURE: 97 F | OXYGEN SATURATION: 95 % | DIASTOLIC BLOOD PRESSURE: 70 MMHG | SYSTOLIC BLOOD PRESSURE: 110 MMHG

## 2024-06-18 VITALS
OXYGEN SATURATION: 92 % | TEMPERATURE: 99 F | HEART RATE: 70 BPM | RESPIRATION RATE: 20 BRPM | DIASTOLIC BLOOD PRESSURE: 83 MMHG | SYSTOLIC BLOOD PRESSURE: 119 MMHG

## 2024-06-18 VITALS
DIASTOLIC BLOOD PRESSURE: 80 MMHG | SYSTOLIC BLOOD PRESSURE: 122 MMHG | RESPIRATION RATE: 16 BRPM | WEIGHT: 198.63 LBS | HEART RATE: 71 BPM | OXYGEN SATURATION: 100 % | BODY MASS INDEX: 31.92 KG/M2 | TEMPERATURE: 98 F | HEIGHT: 66 IN

## 2024-06-18 DIAGNOSIS — K62.89 RECTAL OR ANAL PAIN: ICD-10-CM

## 2024-06-18 DIAGNOSIS — R10.10 UPPER ABDOMINAL PAIN: Primary | ICD-10-CM

## 2024-06-18 DIAGNOSIS — R10.9 ABDOMINAL PAIN, ACUTE: Primary | ICD-10-CM

## 2024-06-18 DIAGNOSIS — K59.00 CONSTIPATION, UNSPECIFIED CONSTIPATION TYPE: ICD-10-CM

## 2024-06-18 DIAGNOSIS — M54.50 ACUTE BILATERAL LOW BACK PAIN WITHOUT SCIATICA: ICD-10-CM

## 2024-06-18 DIAGNOSIS — R11.0 NAUSEA: ICD-10-CM

## 2024-06-18 DIAGNOSIS — R10.9 ABDOMINAL PAIN, UNSPECIFIED ABDOMINAL LOCATION: Primary | ICD-10-CM

## 2024-06-18 LAB
ALBUMIN SERPL-MCNC: 4.2 G/DL (ref 3.2–4.8)
ALBUMIN/GLOB SERPL: 1.5 {RATIO} (ref 1–2)
ALP LIVER SERPL-CCNC: 69 U/L
ALT SERPL-CCNC: 12 U/L
ANION GAP SERPL CALC-SCNC: 8 MMOL/L (ref 0–18)
AST SERPL-CCNC: 18 U/L (ref ?–34)
B-HCG UR QL: NEGATIVE
BASOPHILS # BLD AUTO: 0.04 X10(3) UL (ref 0–0.2)
BASOPHILS NFR BLD AUTO: 0.5 %
BILIRUB SERPL-MCNC: 0.4 MG/DL (ref 0.3–1.2)
BILIRUB UR QL: NEGATIVE
BUN BLD-MCNC: 11 MG/DL (ref 9–23)
BUN/CREAT SERPL: 17.7 (ref 10–20)
CALCIUM BLD-MCNC: 8.7 MG/DL (ref 8.7–10.4)
CHLORIDE SERPL-SCNC: 113 MMOL/L (ref 98–112)
CLARITY UR: CLEAR
CO2 SERPL-SCNC: 24 MMOL/L (ref 21–32)
CREAT BLD-MCNC: 0.62 MG/DL
DEPRECATED RDW RBC AUTO: 42.6 FL (ref 35.1–46.3)
EGFRCR SERPLBLD CKD-EPI 2021: 125 ML/MIN/1.73M2 (ref 60–?)
EOSINOPHIL # BLD AUTO: 0.38 X10(3) UL (ref 0–0.7)
EOSINOPHIL NFR BLD AUTO: 4.4 %
ERYTHROCYTE [DISTWIDTH] IN BLOOD BY AUTOMATED COUNT: 13.5 % (ref 11–15)
GLOBULIN PLAS-MCNC: 2.8 G/DL (ref 2–3.5)
GLUCOSE BLD-MCNC: 155 MG/DL (ref 70–99)
GLUCOSE UR-MCNC: >1000 MG/DL
HCT VFR BLD AUTO: 42.4 %
HGB BLD-MCNC: 14.1 G/DL
IMM GRANULOCYTES # BLD AUTO: 0.02 X10(3) UL (ref 0–1)
IMM GRANULOCYTES NFR BLD: 0.2 %
KETONES UR-MCNC: 20 MG/DL
LEUKOCYTE ESTERASE UR QL STRIP.AUTO: NEGATIVE
LIPASE SERPL-CCNC: 38 U/L (ref 13–75)
LYMPHOCYTES # BLD AUTO: 2.62 X10(3) UL (ref 1–4)
LYMPHOCYTES NFR BLD AUTO: 30.3 %
MAGNESIUM SERPL-MCNC: 2.1 MG/DL (ref 1.6–2.6)
MCH RBC QN AUTO: 28.8 PG (ref 26–34)
MCHC RBC AUTO-ENTMCNC: 33.3 G/DL (ref 31–37)
MCV RBC AUTO: 86.7 FL
MONOCYTES # BLD AUTO: 0.72 X10(3) UL (ref 0.1–1)
MONOCYTES NFR BLD AUTO: 8.3 %
NEUTROPHILS # BLD AUTO: 4.88 X10 (3) UL (ref 1.5–7.7)
NEUTROPHILS # BLD AUTO: 4.88 X10(3) UL (ref 1.5–7.7)
NEUTROPHILS NFR BLD AUTO: 56.3 %
NITRITE UR QL STRIP.AUTO: NEGATIVE
OSMOLALITY SERPL CALC.SUM OF ELEC: 303 MOSM/KG (ref 275–295)
PH UR: 5 [PH] (ref 5–8)
PLATELET # BLD AUTO: 326 10(3)UL (ref 150–450)
POTASSIUM SERPL-SCNC: 3.3 MMOL/L (ref 3.5–5.1)
PROT SERPL-MCNC: 7 G/DL (ref 5.7–8.2)
PROT UR-MCNC: NEGATIVE MG/DL
RBC # BLD AUTO: 4.89 X10(6)UL
SODIUM SERPL-SCNC: 145 MMOL/L (ref 136–145)
SP GR UR STRIP: >1.03 (ref 1–1.03)
UROBILINOGEN UR STRIP-ACNC: NORMAL
WBC # BLD AUTO: 8.7 X10(3) UL (ref 4–11)

## 2024-06-18 PROCEDURE — 80053 COMPREHEN METABOLIC PANEL: CPT | Performed by: EMERGENCY MEDICINE

## 2024-06-18 PROCEDURE — 85025 COMPLETE CBC W/AUTO DIFF WBC: CPT | Performed by: EMERGENCY MEDICINE

## 2024-06-18 PROCEDURE — 74177 CT ABD & PELVIS W/CONTRAST: CPT | Performed by: EMERGENCY MEDICINE

## 2024-06-18 PROCEDURE — 99285 EMERGENCY DEPT VISIT HI MDM: CPT

## 2024-06-18 PROCEDURE — 83735 ASSAY OF MAGNESIUM: CPT | Performed by: EMERGENCY MEDICINE

## 2024-06-18 PROCEDURE — 99215 OFFICE O/P EST HI 40 MIN: CPT | Performed by: PHYSICIAN ASSISTANT

## 2024-06-18 PROCEDURE — 81001 URINALYSIS AUTO W/SCOPE: CPT | Performed by: EMERGENCY MEDICINE

## 2024-06-18 PROCEDURE — 81025 URINE PREGNANCY TEST: CPT

## 2024-06-18 PROCEDURE — 99284 EMERGENCY DEPT VISIT MOD MDM: CPT

## 2024-06-18 PROCEDURE — 99215 OFFICE O/P EST HI 40 MIN: CPT | Performed by: NURSE PRACTITIONER

## 2024-06-18 PROCEDURE — 36415 COLL VENOUS BLD VENIPUNCTURE: CPT

## 2024-06-18 PROCEDURE — 83690 ASSAY OF LIPASE: CPT | Performed by: EMERGENCY MEDICINE

## 2024-06-18 RX ORDER — POLYETHYLENE GLYCOL 3350 17 G/17G
17 POWDER, FOR SOLUTION ORAL 2 TIMES DAILY PRN
Qty: 30 EACH | Refills: 0 | Status: SHIPPED | OUTPATIENT
Start: 2024-06-18 | End: 2024-07-18

## 2024-06-18 NOTE — ED INITIAL ASSESSMENT (HPI)
Pt c/o abdominal pain x 3 days.   Last BM 2 days ago. +nausea. Denies fevers or urinary symptoms.

## 2024-06-18 NOTE — ED PROVIDER NOTES
Notify physician to follow-up results of CT.  Patient presenting with upper abdominal pain for the past 2 days.  I have independently reviewed patient's CT scan and it is significant for constipation but no other acute findings.  Reviewed results with patient and will discharge on MiraLAX.  Patient is comfortable with plan.    CT ABDOMEN+PELVIS(CONTRAST ONLY)(CPT=74177)    Result Date: 6/18/2024  CONCLUSION:  1. No acute finding. 2. Moderate to large amount of stool in the colon suggests constipation.    Dictated by (CST): Ry Rao MD on 6/18/2024 at 6:02 PM     Finalized by (CST): Ry Rao MD on 6/18/2024 at 6:07 PM

## 2024-06-18 NOTE — ED INITIAL ASSESSMENT (HPI)
Patient arrives ambulatory through triage with c/o of generalized upper abdominal pain that started 2 days ago.

## 2024-06-18 NOTE — PROGRESS NOTES
Subjective:   Patient ID: Robyn Mariscal is a 29 year old female.    Patient is a 29 year old female who presents today with complaints of abdominal pain, rectal pain, nausea and low back pain x 3 days. Abdominal pain is described as \"pain\". It is intermittent in nature. Rectal pain feels internal. Denies vomiting, diarrhea, fevers, body aches, chills, UTI symptoms. No hematuria, blood in stool or blood with wiping. Denies history of abdominal surgeries. No recent travel. Nobody at home with GI symptoms. Denies hx constipation but last BM was 2 days ago. + straining. Denies hx hemorrhoids and does not currently think she has any. Tolerating PO well at home. Attempted treatment prior to arrival = none. LMP started 4 days ago, currently on cycle.        History/Other:   Review of Systems   Constitutional:  Negative for appetite change, chills and fever.   Gastrointestinal:  Positive for abdominal pain, constipation, nausea and rectal pain. Negative for anal bleeding, blood in stool, diarrhea and vomiting.   Genitourinary:  Negative for dysuria, flank pain, frequency, hematuria and urgency.   Musculoskeletal:  Positive for back pain. Negative for myalgias.     Current Outpatient Medications   Medication Sig Dispense Refill    metFORMIN 850 MG Oral Tab Take 1 tablet (850 mg total) by mouth 2 (two) times daily with meals. 60 tablet 0    ONETOUCH ULTRA In Vitro Strip Use to test blood sugar three times daily 100 each 3    Lancets (ONETOUCH DELICA PLUS [id number]) Does not apply Misc 1 Stick 3 (three) times daily. 100 each 3    ergocalciferol 1.25 MG (36451 UT) Oral Cap Take 1 capsule (50,000 Units total) by mouth once a week. 12 capsule 3    Empagliflozin (JARDIANCE) 25 MG Oral Tab Take 1 tablet by mouth daily. 90 tablet 2     Allergies:No Known Allergies    /80   Pulse 71   Temp 97.8 °F (36.6 °C)   Resp 16   Ht 5' 6\" (1.676 m)   Wt 198 lb 9.6 oz (90.1 kg)   LMP 06/14/2024 (Exact Date)   SpO2 100%   BMI 32.05  kg/m²     Objective:   Physical Exam  Vitals reviewed.   Constitutional:       General: She is awake. She is not in acute distress.     Appearance: Normal appearance. She is well-developed and well-groomed. She is not ill-appearing, toxic-appearing or diaphoretic.   HENT:      Head: Normocephalic and atraumatic.   Cardiovascular:      Rate and Rhythm: Normal rate and regular rhythm.   Pulmonary:      Effort: Pulmonary effort is normal. No respiratory distress.      Breath sounds: Normal breath sounds and air entry. No decreased breath sounds, wheezing, rhonchi or rales.   Abdominal:      General: Abdomen is flat. Bowel sounds are decreased.      Palpations: Abdomen is soft.      Tenderness: There is abdominal tenderness in the right upper quadrant, epigastric area, periumbilical area and left upper quadrant. There is no right CVA tenderness, left CVA tenderness or guarding. Negative signs include McBurney's sign.   Genitourinary:     Rectum: No tenderness or external hemorrhoid. Normal anal tone.   Skin:     General: Skin is warm and dry.   Neurological:      Mental Status: She is alert and oriented to person, place, and time.   Psychiatric:         Behavior: Behavior is cooperative.         Assessment & Plan:   1. Abdominal pain, unspecified abdominal location    2. Nausea    3. Acute bilateral low back pain without sciatica    4. Rectal or anal pain        No orders of the defined types were placed in this encounter.      Meds This Visit:  Requested Prescriptions      No prescriptions requested or ordered in this encounter     Discussed with patient that due to HPI, duration of symptoms and clinical exam findings, I am recommending transfer to a higher level of care.  Due to the limited diagnostic capabilities of this location, I am unable to fully evaluate the cause of her symptoms.  To go to [facility] ED for further evaluation.  She is comfortable with self transport.  Patient vu and is comfortable with this  plan.

## 2024-06-18 NOTE — ED PROVIDER NOTES
Patient Seen in: [facility]    History     Chief Complaint   Patient presents with    Abdomen/Flank Pain     Stated Complaint: Abdominal pain    HPI    Robyn Mariscal is a 29 year old female who presents with chief complaint of upper abdominal pain.  Onset 2 days ago.  Patient reports associated nausea without vomiting.  Patient denies fever, chills, vomiting, diarrhea, constipation, melena, hematochezia, dysuria, hematuria, flank pain, vaginal bleeding, vaginal discharge.          Past Medical History:    Diabetes (HCC)    Diabetes mellitus (HCC)    Gestational diabetes (HCC)       History reviewed. No pertinent surgical history.         Family History   Problem Relation Age of Onset    Diabetes Mother     Diabetes Maternal Grandmother     Diabetes Maternal Grandfather        Social History     Socioeconomic History    Marital status:    Tobacco Use    Smoking status: Never    Smokeless tobacco: Never   Substance and Sexual Activity    Alcohol use: Not Currently    Drug use: Not Currently       Review of Systems    Positive for stated complaint: Abdominal pain  Other systems are as noted in HPI.  Constitutional and vital signs reviewed.      All other systems reviewed and negative except as noted above.    PSFH elements reviewed from today and agreed except as otherwise stated in HPI.    Physical Exam     ED Triage Vitals [06/18/24 1451]   /70   Pulse 66   Resp 16   Temp 97.4 °F (36.3 °C)   Temp src Temporal   SpO2 95 %   O2 Device None (Room air)       Current:/70   Pulse 66   Temp 97.4 °F (36.3 °C) (Temporal)   Resp 16   LMP 06/14/2024 (Exact Date)   SpO2 95%     PULSE OX within normal limits on room air as interpreted by this provider.        Physical Exam    Constitutional: The patient is cooperative. Appears well-developed and well-nourished.  Mild discomfort.  Psychological: Alert, No abnormalities of mood, affect.  Head: Normocephalic/atraumatic.  Eyes: Pupils are equal  round reactive to light.  Conjunctiva are within normal limits.  ENT: Oropharynx is clear.  Mucous membranes moist.  Neck: The neck is supple.  No meningeal signs.  Chest: There is no tenderness to the chest wall.  No CVA tenderness bilaterally.  Respiratory: Respiratory effort was normal.  There is no stridor.  Air entry is equal.  Cardiovascular: Regular rate and rhythm.  Capillary refill is brisk.    Gastrointestinal: Positive tenderness to palpation present at right upper quadrant, epigastrium and left upper quadrant.  Abdomen soft, nondistended.  There is no rebound tenderness or guarding.  No organomegaly is noted. Normal bowel sounds.  Genitourinary: Not examined.  Lymphatic: No gross lymphadenopathy noted.  Musculoskeletal: Musculoskeletal system is grossly intact.  There is no obvious deformity.  Neurological: Gross motor movement is intact in all 4 extremities.  Patient exhibits normal speech.  Skin: Skin is normal to inspection.  Warm and dry.  No obvious bruising.  No obvious rash.          ED Course   Labs Reviewed - No data to display    MDM     Physical exam remained stable as previously documented.  Physical exam findings discussed with patient.    29-year-old female presents with 2-day history of upper abdominal pain and nausea without vomiting.  Patient is tender to palpation at right upper quadrant, epigastrium and left upper quadrant.  Discussed with patient need for further evaluation and possible workup in emergency department.  Patient is agreeable.    Patient case discussed with Dr. Simon.      Disposition and Plan     Clinical Impression:  1. Upper abdominal pain        Disposition:  Ic to ed    Follow-up:  No follow-up provider specified.    Medications Prescribed:  Current Discharge Medication List

## 2024-06-18 NOTE — ED PROVIDER NOTES
Patient Seen in: [facility] Emergency Department      History     Chief Complaint   Patient presents with    Abdominal Pain     Stated Complaint: abd pain    Subjective:   HPI    29-year-old female presents for evaluation for abdominal pain for the past 2 days.  She states that the pain is severe, constant, does not radiate and is associate with nausea but no vomiting.  She denies any fevers, chills, diarrhea.  She does report some constipation for the past 3 days.    Objective:   Past Medical History:    Diabetes (HCC)    Diabetes mellitus (HCC)    Gestational diabetes (HCC)              History reviewed. No pertinent surgical history.             Social History     Socioeconomic History    Marital status:    Tobacco Use    Smoking status: Never    Smokeless tobacco: Never   Substance and Sexual Activity    Alcohol use: Not Currently    Drug use: Not Currently              Review of Systems    Positive for stated complaint: abd pain  Other systems are as noted in HPI.  Constitutional and vital signs reviewed.      All other systems reviewed and negative except as noted above.    Physical Exam     ED Triage Vitals [06/18/24 1530]   /75   Pulse 80   Resp 20   Temp 98.8 °F (37.1 °C)   Temp src    SpO2 97 %   O2 Device None (Room air)       Current Vitals:   Vital Signs  BP: 104/61  Pulse: 59  Resp: 20  Temp: 98.8 °F (37.1 °C)  MAP (mmHg): 75    Oxygen Therapy  SpO2: 97 %  O2 Device: None (Room air)            Physical Exam  Vitals and nursing note reviewed.   Constitutional:       Appearance: Normal appearance.   HENT:      Head: Normocephalic and atraumatic.   Cardiovascular:      Rate and Rhythm: Normal rate and regular rhythm.      Pulses: Normal pulses.   Pulmonary:      Effort: Pulmonary effort is normal.      Breath sounds: Normal breath sounds.   Abdominal:      General: Bowel sounds are normal.      Palpations: Abdomen is soft.      Tenderness: There is abdominal tenderness in the right  upper quadrant, epigastric area and left upper quadrant.   Musculoskeletal:         General: Normal range of motion.      Cervical back: Normal range of motion.   Skin:     General: Skin is warm and dry.   Neurological:      General: No focal deficit present.      Mental Status: She is alert.               ED Course     Labs Reviewed   COMP METABOLIC PANEL (14) - Abnormal; Notable for the following components:       Result Value    Glucose 155 (*)     Potassium 3.3 (*)     Chloride 113 (*)     Calculated Osmolality 303 (*)     All other components within normal limits   URINALYSIS WITH CULTURE REFLEX - Abnormal; Notable for the following components:    Spec Gravity >1.030 (*)     Glucose Urine >1000 (*)     Ketones Urine 20 (*)     Blood Urine 1+ (*)     RBC Urine 3-5 (*)     Squamous Epi. Cells Few (*)     All other components within normal limits   LIPASE - Normal   MAGNESIUM - Normal   POCT PREGNANCY URINE - Normal   CBC WITH DIFFERENTIAL WITH PLATELET    Narrative:     The following orders were created for panel order CBC With Differential With Platelet.  Procedure                               Abnormality         Status                     ---------                               -----------         ------                     CBC W/ DIFFERENTIAL[[id number]]                              Final result                 Please view results for these tests on the individual orders.   CBC W/ DIFFERENTIAL                      MDM                                         Medical Decision Making  Differential diagnosis includes but is not limited to cholelithiasis, cholecystitis, diverticulitis, appendicitis, pancreatitis, gastritis, constipation, etc.    CBC CMP lipase urinalysis are all unremarkable.  CT imaging is pending.  Patient endorsed to the incoming ED physician pending imaging and disposition.      Amount and/or Complexity of Data Reviewed  External Data Reviewed: notes.     Details: Immediate care note from today  reviewed, patient presented with abdominal pain for 2 days and was present in the upper abdomen and advised to go to the ED for further workup and treatment.  Labs: ordered. Decision-making details documented in ED Course.  Radiology: ordered.        Disposition and Plan     Clinical Impression:  1. Abdominal pain, acute         Disposition:  There is no disposition on file for this visit.  There is no disposition time on file for this visit.    Follow-up:  Socorro Londono MD  [address] [place] [address]  [address]  [place] [address]  [phone]    Follow up            Medications Prescribed:  Current Discharge Medication List

## 2025-03-07 RX ORDER — ERGOCALCIFEROL 1.25 MG/1
50000 CAPSULE, LIQUID FILLED ORAL WEEKLY
Qty: 12 CAPSULE | Refills: 3 | OUTPATIENT
Start: 2025-03-07

## 2025-03-07 NOTE — TELEPHONE ENCOUNTER
Please review. Protocol Failed; No Protocol    Medication(s) to Refill:   Requested Prescriptions     Pending Prescriptions Disp Refills    ERGOCALCIFEROL 1.25 MG (32353 UT) Oral Cap [Pharmacy Med Name: VITAMIN D2 50,000IU (ERGO) CAP RX] 12 capsule 3     Sig: TAKE 1 CAPSULE BY MOUTH 1 TIME A WEEK    JARDIANCE 25 MG Oral Tab [Pharmacy Med Name: JARDIANCE 25MG TABLETS] 90 tablet 2     Sig: Take 1 tablet by mouth daily.         Reason for Medication Refill being sent to Provider / Reason Protocol Failed:  [x] Non-Protocol Medication        Recent Labs:  Lab Results   Component Value Date    VITD 19.7 (L) 09/21/2023

## 2025-03-13 RX ORDER — ERGOCALCIFEROL 1.25 MG/1
50000 CAPSULE, LIQUID FILLED ORAL WEEKLY
COMMUNITY
Start: 2024-11-08 | End: 2025-03-17

## 2025-03-14 NOTE — TELEPHONE ENCOUNTER
Please review. Refill failed protocol.     Patient Comment: Hi doctor i need some refills I don’t have any my sugar is going up thank you.    Future Appointments   Date Time Provider Department Center   3/17/2025 10:40 AM Monroe Nolan APRN [facility]

## 2025-03-14 NOTE — PROGRESS NOTES
Subjective:   Robyn Mariscal is a 29 year old female who presents for Physical (Lab order,)   Patient is a pleasant 29-year-old female with a past medical history consistent for diabetes mellitus type 2, obesity, vitamin D deficiency.  Patient presents to office today new to this provider for physical.  Patient states her health is she is worried about her sugars. It was 125 this morning. She has been out of medication metformin and jardiance. She has been out for 2 years    Diet: has been trying to eat more vegetables. Mostly home cooked. Does have some tortillas. No sweets.   Exercise: She is walking. Everyday at work. Educated on recommendations.   Sleep: 8 hours per night. No issues staying asleep or staying asleep.   Stress: high with work. Likes to walk when stressed.   Social: , 2 kids (7 and 4), lives in [facility].   Sexually Active:  in [place].  Menses: regular, 30 days, 5 days, pads 3 at heaviest.  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   Vaccinations: Pneumonia and flu at health department.            Past Medical History:    Diabetes (HCC)    Diabetes mellitus (HCC)    Gestational diabetes (HCC)      History reviewed. No pertinent surgical history.     History/Other:    Chief Complaint Reviewed and Verified  Nursing Notes Reviewed and   Verified  Tobacco Reviewed  Allergies Reviewed  Medications Reviewed    Problem List Reviewed  Medical History Reviewed  Surgical History   Reviewed  OB Status Reviewed  Family History Reviewed  Social History   Reviewed         Tobacco:  She has never smoked tobacco.    Current Outpatient Medications   Medication Sig Dispense Refill    ONETOUCH ULTRA In Vitro Strip Use to test blood sugar three times daily 100 each 3    Lancets (ONETOUCH DELICA PLUS [id number]) Does not apply Misc 1 Stick 3 (three) times daily. 100 each 3    ergocalciferol 1.25 MG (71917 UT) Oral Cap Take 1 capsule  (50,000 Units total) by mouth once a week. (Patient not taking: Reported on 3/17/2025)      metFORMIN 850 MG Oral Tab Take 1 tablet (850 mg total) by mouth 2 (two) times daily with meals. (Patient not taking: Reported on 3/17/2025) 60 tablet 0    Empagliflozin (JARDIANCE) 25 MG Oral Tab Take 1 tablet by mouth daily. (Patient not taking: Reported on 3/17/2025) 90 tablet 2         Review of Systems:  Review of Systems   Constitutional: Negative.  Negative for activity change, appetite change, chills and fever.   HENT: Negative.  Negative for congestion, postnasal drip, rhinorrhea, sore throat, tinnitus and voice change.    Eyes: Negative.    Respiratory: Negative.  Negative for apnea, cough, chest tightness and shortness of breath.    Cardiovascular:  Negative for chest pain and leg swelling.   Gastrointestinal: Negative.  Negative for abdominal pain, anal bleeding, blood in stool, constipation, diarrhea, nausea and vomiting.   Genitourinary: Negative.  Negative for difficulty urinating, flank pain and menstrual problem.   Musculoskeletal: Negative.  Negative for joint swelling.   Skin: Negative.    Neurological: Negative.  Negative for dizziness and headaches.   Psychiatric/Behavioral: Negative.  Negative for agitation.          Objective:   /69 (BP Location: Left arm, Patient Position: Sitting, Cuff Size: large)   Pulse 85   Ht 5' 6\" (1.676 m)   Wt 204 lb 6.4 oz (92.7 kg)   LMP 03/10/2025 (Exact Date)   SpO2 99%   BMI 32.99 kg/m²  Estimated body mass index is 32.99 kg/m² as calculated from the following:    Height as of this encounter: 5' 6\" (1.676 m).    Weight as of this encounter: 204 lb 6.4 oz (92.7 kg).  Physical Exam  Vitals and nursing note reviewed.   Constitutional:       General: She is not in acute distress.     Appearance: Normal appearance. She is well-developed. She is obese.   HENT:      Head: Normocephalic and atraumatic.      Right Ear: Tympanic membrane, ear canal and external ear  normal. There is no impacted cerumen.      Left Ear: Tympanic membrane, ear canal and external ear normal. There is no impacted cerumen.      Nose: Nose normal. No congestion or rhinorrhea.      Mouth/Throat:      Mouth: Mucous membranes are moist.      Pharynx: Oropharynx is clear. No oropharyngeal exudate or posterior oropharyngeal erythema.   Eyes:      Conjunctiva/sclera: Conjunctivae normal.      Pupils: Pupils are equal, round, and reactive to light.   Neck:      Thyroid: No thyromegaly.   Cardiovascular:      Rate and Rhythm: Normal rate and regular rhythm.      Pulses: Normal pulses.      Heart sounds: Normal heart sounds. No murmur heard.  Pulmonary:      Effort: Pulmonary effort is normal. No respiratory distress.      Breath sounds: Normal breath sounds. No wheezing or rales.   Chest:      Chest wall: No tenderness.   Abdominal:      General: Bowel sounds are normal. There is no distension.      Palpations: Abdomen is soft.      Tenderness: There is no abdominal tenderness.   Genitourinary:     Vagina: Normal.   Musculoskeletal:         General: No tenderness. Normal range of motion.      Cervical back: Normal range of motion and neck supple.   Lymphadenopathy:      Cervical: No cervical adenopathy.   Skin:     General: Skin is warm and dry.      Capillary Refill: Capillary refill takes less than 2 seconds.      Findings: No rash.   Neurological:      Mental Status: She is alert and oriented to person, place, and time.      Coordination: Coordination normal.      Deep Tendon Reflexes: Reflexes are normal and symmetric.   Psychiatric:         Behavior: Behavior normal.         Thought Content: Thought content normal.         Judgment: Judgment normal.           Assessment & Plan:   1. Encounter for wellness examination in adult (Primary)  -     Microalb/Creat Ratio, Random Urine; Future; Expected date: 03/17/2025  -     CBC With Differential With Platelet; Future; Expected date: 03/17/2025  -     Comp  Metabolic Panel (14); Future; Expected date: 03/17/2025  -     TSH W Reflex To Free T4; Future; Expected date: 03/17/2025  2. Type 2 diabetes mellitus without complication, unspecified whether long term insulin use (HCC)  Overview:  IMPRESSION:  IUP at 13w6d  First Trimester Screening: normal NT  Pre-gestational Diabetes     RECOMMENDATIONS:  Continue care with Dr. Tejeda  Follow-up with FTS results when they become available.  20 anatomic survey - with OB or level I with MFM at the discretion of primary OB.  Continue four times daily capillary blood glucose assessments (fasting and 2 hour postprandial)  Weekly Maternal-Fetal Medicine review of capillary blood glucose values  Level 2 ultrasound at 20 weeks  Fetal echocardiogram at 22-24 weeks  Follow-up growth ultrasound every 4 weeks in the third trimester  Weekly NSTs at 32 weeks; twice weekly NST's at 34 weeks   Orders:  -     Podiatry Referral  -     Ophthalmology Referral - In Network  -     Microalb/Creat Ratio, Random Urine; Future; Expected date: 03/17/2025  -     Hemoglobin A1C; Future; Expected date: 03/17/2025  -     Comp Metabolic Panel (14); Future; Expected date: 03/17/2025  -     Lipid Panel; Future; Expected date: 03/17/2025  3. Morbid (severe) obesity due to excess calories (HCC)  -     Hemoglobin A1C; Future; Expected date: 03/17/2025  -     Lipid Panel; Future; Expected date: 03/17/2025  4. History of vitamin D deficiency  -     Vitamin D; Future; Expected date: 03/17/2025    1. Encounter for wellness examination in adult  Wellness labs ordered.  Encouraged increasing physical activity which includes raising heart rate 3 to 5 days/week for at least 30 minutes at a time, while also performing mild strength exercises.  Patient counseled on importance of dietary modifications, which may include limiting fats, red meat, and carbohydrates, while increasing fruit and vegetable intake, and trying to adhere to a low sodium/Mediterranean diet.  Encouraged  to manage stress.  Encouraged good sleep habits.  Encouraged good sexual health.    - Microalb/Creat Ratio, Random Urine; Future  - CBC With Differential With Platelet; Future  - Comp Metabolic Panel (14); Future  - TSH W Reflex To Free T4; Future    2. Type 2 diabetes mellitus without complication, unspecified whether long term insulin use (HCC)  Off meds x2 years  Sugar this am 125  Check a1c  Microalbumin creatinine urine test ordered  Podiatry referral for diabetic foot exam, educated on annual need.  Ophthalmology referral for diabetic eye exam, educated on annual need.  Educated on carb counting  Provided with referral to endocrinology.   Diabetic tests and supplies  Check sugars BID  Recheck A1c 3 months    - Podiatry Referral - In Network  - Ophthalmology Referral - In Network  - Microalb/Creat Ratio, Random Urine; Future  - Hemoglobin A1C; Future  - Comp Metabolic Panel (14); Future  - Lipid Panel; Future    3. Morbid (severe) obesity due to excess calories (HCC)  BMI in office today 32.99  Educated on lifestyle modification  Check baseline labs    - Hemoglobin A1C; Future  - Lipid Panel; Future    4. History of vitamin D deficiency  Recheck with wellness labs   - Vitamin D; Future    Patient aware of plan of care. All questions answered to satisfaction of the patient. Patient instructed to call office or reach out via [email or web address] if any issues arise. For urgent issues and/or reviewed red flags please proceed to the urgent care or ER.  Also, inform the nurse practitioner with any new symptoms or medication side effects.        Return in about 3 months (around 6/17/2025).    AFSHAN Freeman, 3/14/2025, 9:49 AM

## 2025-03-17 ENCOUNTER — OFFICE VISIT (OUTPATIENT)
Dept: FAMILY MEDICINE CLINIC | Facility: CLINIC | Age: 30
End: 2025-03-17

## 2025-03-17 ENCOUNTER — LAB ENCOUNTER (OUTPATIENT)
Dept: LAB | Age: 30
End: 2025-03-17
Payer: MEDICAID

## 2025-03-17 VITALS
HEIGHT: 66 IN | SYSTOLIC BLOOD PRESSURE: 109 MMHG | BODY MASS INDEX: 32.85 KG/M2 | HEART RATE: 85 BPM | DIASTOLIC BLOOD PRESSURE: 69 MMHG | WEIGHT: 204.38 LBS | OXYGEN SATURATION: 99 %

## 2025-03-17 DIAGNOSIS — E66.01 MORBID (SEVERE) OBESITY DUE TO EXCESS CALORIES (HCC): ICD-10-CM

## 2025-03-17 DIAGNOSIS — Z86.39 HISTORY OF VITAMIN D DEFICIENCY: ICD-10-CM

## 2025-03-17 DIAGNOSIS — E11.9 TYPE 2 DIABETES MELLITUS WITHOUT COMPLICATION, UNSPECIFIED WHETHER LONG TERM INSULIN USE (HCC): ICD-10-CM

## 2025-03-17 DIAGNOSIS — Z00.00 ENCOUNTER FOR WELLNESS EXAMINATION IN ADULT: Primary | ICD-10-CM

## 2025-03-17 DIAGNOSIS — E78.5 HYPERLIPIDEMIA, UNSPECIFIED HYPERLIPIDEMIA TYPE: ICD-10-CM

## 2025-03-17 DIAGNOSIS — Z00.00 ENCOUNTER FOR WELLNESS EXAMINATION IN ADULT: ICD-10-CM

## 2025-03-17 LAB
ALBUMIN SERPL-MCNC: 4.3 G/DL (ref 3.2–4.8)
ALBUMIN/GLOB SERPL: 1.5 {RATIO} (ref 1–2)
ALP LIVER SERPL-CCNC: 74 U/L
ALT SERPL-CCNC: 12 U/L
ANION GAP SERPL CALC-SCNC: 8 MMOL/L (ref 0–18)
AST SERPL-CCNC: 17 U/L (ref ?–34)
BASOPHILS # BLD AUTO: 0.04 X10(3) UL (ref 0–0.2)
BASOPHILS NFR BLD AUTO: 0.6 %
BILIRUB SERPL-MCNC: 0.4 MG/DL (ref 0.3–1.2)
BUN BLD-MCNC: 9 MG/DL (ref 9–23)
BUN/CREAT SERPL: 15.8 (ref 10–20)
CALCIUM BLD-MCNC: 9.1 MG/DL (ref 8.7–10.4)
CHLORIDE SERPL-SCNC: 105 MMOL/L (ref 98–112)
CHOLEST SERPL-MCNC: 218 MG/DL (ref ?–200)
CO2 SERPL-SCNC: 27 MMOL/L (ref 21–32)
CREAT BLD-MCNC: 0.57 MG/DL
CREAT UR-SCNC: 131.7 MG/DL
DEPRECATED RDW RBC AUTO: 42.2 FL (ref 35.1–46.3)
EGFRCR SERPLBLD CKD-EPI 2021: 128 ML/MIN/1.73M2 (ref 60–?)
EOSINOPHIL # BLD AUTO: 0.29 X10(3) UL (ref 0–0.7)
EOSINOPHIL NFR BLD AUTO: 4 %
ERYTHROCYTE [DISTWIDTH] IN BLOOD BY AUTOMATED COUNT: 12.8 % (ref 11–15)
EST. AVERAGE GLUCOSE BLD GHB EST-MCNC: 174 MG/DL (ref 68–126)
FASTING PATIENT LIPID ANSWER: YES
FASTING STATUS PATIENT QL REPORTED: YES
GLOBULIN PLAS-MCNC: 2.9 G/DL (ref 2–3.5)
GLUCOSE BLD-MCNC: 140 MG/DL (ref 70–99)
HBA1C MFR BLD: 7.7 % (ref ?–5.7)
HCT VFR BLD AUTO: 44.3 %
HDLC SERPL-MCNC: 48 MG/DL (ref 40–59)
HGB BLD-MCNC: 14.2 G/DL
IMM GRANULOCYTES # BLD AUTO: 0.02 X10(3) UL (ref 0–1)
IMM GRANULOCYTES NFR BLD: 0.3 %
LDLC SERPL CALC-MCNC: 142 MG/DL (ref ?–100)
LYMPHOCYTES # BLD AUTO: 2.3 X10(3) UL (ref 1–4)
LYMPHOCYTES NFR BLD AUTO: 31.6 %
MCH RBC QN AUTO: 28.7 PG (ref 26–34)
MCHC RBC AUTO-ENTMCNC: 32.1 G/DL (ref 31–37)
MCV RBC AUTO: 89.7 FL
MICROALBUMIN UR-MCNC: <0.3 MG/DL
MONOCYTES # BLD AUTO: 0.61 X10(3) UL (ref 0.1–1)
MONOCYTES NFR BLD AUTO: 8.4 %
NEUTROPHILS # BLD AUTO: 4.01 X10 (3) UL (ref 1.5–7.7)
NEUTROPHILS # BLD AUTO: 4.01 X10(3) UL (ref 1.5–7.7)
NEUTROPHILS NFR BLD AUTO: 55.1 %
NONHDLC SERPL-MCNC: 170 MG/DL (ref ?–130)
OSMOLALITY SERPL CALC.SUM OF ELEC: 291 MOSM/KG (ref 275–295)
PLATELET # BLD AUTO: 332 10(3)UL (ref 150–450)
POTASSIUM SERPL-SCNC: 4 MMOL/L (ref 3.5–5.1)
PROT SERPL-MCNC: 7.2 G/DL (ref 5.7–8.2)
RBC # BLD AUTO: 4.94 X10(6)UL
SODIUM SERPL-SCNC: 140 MMOL/L (ref 136–145)
TRIGL SERPL-MCNC: 155 MG/DL (ref 30–149)
TSI SER-ACNC: 0.8 UIU/ML (ref 0.55–4.78)
VIT D+METAB SERPL-MCNC: 37.8 NG/ML (ref 30–100)
VLDLC SERPL CALC-MCNC: 29 MG/DL (ref 0–30)
WBC # BLD AUTO: 7.3 X10(3) UL (ref 4–11)

## 2025-03-17 PROCEDURE — 36415 COLL VENOUS BLD VENIPUNCTURE: CPT

## 2025-03-17 PROCEDURE — 99395 PREV VISIT EST AGE 18-39: CPT

## 2025-03-17 PROCEDURE — 82306 VITAMIN D 25 HYDROXY: CPT

## 2025-03-17 PROCEDURE — 82043 UR ALBUMIN QUANTITATIVE: CPT

## 2025-03-17 PROCEDURE — 80053 COMPREHEN METABOLIC PANEL: CPT

## 2025-03-17 PROCEDURE — 84443 ASSAY THYROID STIM HORMONE: CPT

## 2025-03-17 PROCEDURE — 80061 LIPID PANEL: CPT

## 2025-03-17 PROCEDURE — 83036 HEMOGLOBIN GLYCOSYLATED A1C: CPT

## 2025-03-17 PROCEDURE — 85025 COMPLETE CBC W/AUTO DIFF WBC: CPT

## 2025-03-17 PROCEDURE — 82570 ASSAY OF URINE CREATININE: CPT

## 2025-03-17 NOTE — PROGRESS NOTES
1. Type 2 diabetes mellitus without complication, unspecified whether long term insulin use (HCC)  Lab Results   Component Value Date    A1C 7.7 (H) 03/17/2025    A1C 8.0 (H) 09/21/2023    A1C 7.9 (A) 08/18/2022    A1C 8.0 (H) 12/17/2021    A1C 6.0 (H) 03/25/2020      Restart metformin 500 mg bid  Recheck A1c 3 months  Dc jardiance  - Podiatry Referral - In Network  - Ophthalmology Referral - In Network  - Microalb/Creat Ratio, Random Urine; Future  - Hemoglobin A1C; Future  - Comp Metabolic Panel (14); Future  - Lipid Panel; Future  - metFORMIN 500 MG Oral Tab; Take 1 tablet (500 mg total) by mouth 2 (two) times daily with meals.  Dispense: 180 tablet; Refill: 1  - Hemoglobin A1C; Future    2. Hyperlipidemia, unspecified hyperlipidemia type  Borderline HLD  Advised lifestyle modifications, recheck 3 months  If LDL not at goal 70 start statin    AFSHAN Freeman

## 2025-04-14 ENCOUNTER — OFFICE VISIT (OUTPATIENT)
Dept: PODIATRY CLINIC | Facility: CLINIC | Age: 30
End: 2025-04-14

## 2025-04-14 DIAGNOSIS — E11.9 TYPE 2 DIABETES MELLITUS WITHOUT COMPLICATION, UNSPECIFIED WHETHER LONG TERM INSULIN USE (HCC): ICD-10-CM

## 2025-04-14 DIAGNOSIS — B35.3 TINEA PEDIS OF BOTH FEET: Primary | ICD-10-CM

## 2025-04-14 PROCEDURE — 99203 OFFICE O/P NEW LOW 30 MIN: CPT | Performed by: STUDENT IN AN ORGANIZED HEALTH CARE EDUCATION/TRAINING PROGRAM

## 2025-04-14 RX ORDER — CLOTRIMAZOLE AND BETAMETHASONE DIPROPIONATE 10; .64 MG/G; MG/G
1 CREAM TOPICAL 2 TIMES DAILY
Qty: 45 G | Refills: 3 | Status: SHIPPED | OUTPATIENT
Start: 2025-04-14

## 2025-04-14 NOTE — PROGRESS NOTES
[facility] Podiatry  Progress Note      Robyn Mariscal is a 29 year old female.   Chief Complaint   Patient presents with    Diabetic Foot Care     New pt- Utilized  Kelsey ID# [id number]- last A1c=7.7 on 3/17/2025-was also  FELIX santos/ AFSHAN Nolan - FBS this ib=676             HPI:     Patient is a pleasant 29-year-old Tam[place]-speaking diabetic female presents to clinic for bilateral foot evaluation.  Denies any pedal injuries or trauma.  Denies any signs of infection.  Denies any pedal pain.    Allergies: Patient has no known allergies.    Current Medications[1]   Past Medical History[2]   Past Surgical History[3]   Family History[4]   Social Hx on file[5]        REVIEW OF SYSTEMS:     Denies nause, fever, chills  No calf pain  Denies chest pain or SOB      EXAM:   [facility] 03/10/2025 (Exact Date)   GENERAL: well developed, well nourished, in no apparent distress  EXTREMITIES:   1. Integument: Normal skin temperature and turgor. Mild left 4th interspace maceration with skin peeling  2. Vascular: Dorsalis pedis two out of four bilateral and posterior tibial pulses two out of   four bilateral, capillary refill normal.   3. Musculoskeletal: All muscle groups are graded 5 out of 5 in the foot and ankle.   4. Neurological: Normal sharp dull sensation; reflexes normal.    Bilateral barefoot skin diabetic exam is normal, visualized feet and the appearance is normal.  Bilateral monofilament/sensation of both feet is normal.  Pulsation pedal pulse exam of both lower legs/feet is normal as well.             ASSESSMENT AND PLAN:   Diagnoses and all orders for this visit:    Type 2 diabetes mellitus without complication, unspecified whether long term insulin use (HCC)    Tinea pedis of both feet        Plan:       -Patient examined, chart history reviewed.  -Discussed importance of proper pedal hygiene, regular foot checks, and tight glucose control.  -Ambulate with supportive shoes and inserts and avoid walking  barefoot.  -Educated patient on acute signs of infection advised patient to seek immediate medical attention if symptoms arise.    RTC in 1 year    The patient indicates understanding of these issues and agrees to the plan.        Vicki Schmitt DPM        [1]   Current Outpatient Medications   Medication Sig Dispense Refill    metFORMIN 500 MG Oral Tab Take 1 tablet (500 mg total) by mouth 2 (two) times daily with meals. 180 tablet 1    ONETOUCH ULTRA In Vitro Strip Use to test blood sugar three times daily 100 each 3    Lancets (ONETOUCH DELICA PLUS [id number]) Does not apply Misc 1 Stick 3 (three) times daily. 100 each 3   [2]   Past Medical History:   Diabetes (HCC)    Diabetes mellitus (HCC)    Gestational diabetes (HCC)   [3] History reviewed. No pertinent surgical history.  [4]   Family History  Problem Relation Age of Onset    Diabetes Mother     Diabetes Maternal Grandmother     Diabetes Maternal Grandfather    [5]   Social History  Socioeconomic History    Marital status:    Tobacco Use    Smoking status: Never     Passive exposure: Never    Smokeless tobacco: Never   Vaping Use    Vaping status: Never Used   Substance and Sexual Activity    Alcohol use: Never    Drug use: Never

## (undated) NOTE — LETTER
AUTHORIZATION FOR SURGICAL OPERATION OR OTHER PROCEDURE    1. I hereby authorize Dr. Bruno Richardson, and [facility] [facility] staff assigned to my case to perform the following operation and/or procedure at the [facility]:    _______________________________________________________________________________________________    Geryl Peeks  _______________________________________________________________________________________________    2. My physician has explained the nature and purpose of the operation or other procedure, possible alternative methods of treatment, the risks involved, and the possibility of complication to me. I acknowledge that no guarantee has been made as to the result that may be obtained. 3.  I recognize that, during the course of this operation, or other procedure, unforseen conditions may necessitate additional or different procedure than those listed above. I, therefore, further authorize and request that the above named physician, his/her physician assistants or designees perform such procedures as are, in his/her professional opinion, necessary and desirable. 4.  Any tissue or organs removed in the operation or other procedure may be disposed of by and at the discretion of the [facility] and [facility]. 5.  I understand that in the event of a medical emergency, I will be transported by local paramedics to [facility] or other \[facility] [place] [facility]\"" emergency department. 6.  I certify that I have read and fully understand the above consent to operation and/or other procedure. 7.  I acknowledge that my physician has explained sedation/analgesia administration to me including the risks and benefits. I consent to the administration of sedation/analgesia as may be necessary or desirable in the judgement of my physician.     Witness signature: ___________________________________________________ Date:  ______/______/_____                    Time: ________ A. M.  P.M. Patient Name:  ______________________________________________________  (please print)      Patient signature:  ___________________________________________________             Relationship to Patient:           []  Parent    Responsible person                          []  Spouse  In case of minor or                    [] Other  _____________   Incompetent name:  __________________________________________________                               (please print)      _____________      Responsible person  In case of minor or  Incompetent signature:  _______________________________________________    Statement of Physician  My signature below affirms that prior to the time of the procedure, I have explained to the patient and/or his/her guardian, the risks and benefits involved in the proposed treatment and any reasonable alternative to the proposed treatment. I have also explained the risks and benefits involved in the refusal of the proposed treatment and have answered the patient's questions.                         Date:  ______/______/_______  Provider                      Signature:  __________________________________________________________       Time:  ___________ A.M    P.M.

## (undated) NOTE — LETTER
[facility]  Administration of Anesthesia  1. Luis Carlos Gustafson, or _________________________________ acting on her behalf, (Patient) (Dependent/Representative) request to receive anesthesia for my pending procedure/operation/treatment.   A phys 6. OBSTETRIC PATIENTS: Specific risks/consequences of spinal/epidural anesthesia may include itching, low blood pressure, difficulty urinating, slowing of the baby's heart rate and headache.  Rare risks include infections, high spinal block, spinal bleeding ___________________________________________________           _____________________________________________________  Date/Time                                                                                               Responsible person in case of minor

## (undated) NOTE — ED AVS SNAPSHOT
Juanpablo Barnett   MRN: [id number]    Department:  [facility] Emergency Department   Date of Visit:  2/3/2020           Disclosure     Insurance plans vary and the physician(s) referred by the ER may not be covered by your plan.  Please contact your i CARE PHYSICIAN AT ONCE OR RETURN IMMEDIATELY TO THE EMERGENCY DEPARTMENT. If you have been prescribed any medication(s), please fill your prescription right away and begin taking the medication(s) as directed.   If you believe that any of the medications

## (undated) NOTE — LETTER
3/30/2020              [address]        [facility] [address]         Dear Lashay Ulrich,    It was a pleasure to see you. Your pap was normal.  There is no need for further testing at this time.   I look forward to seeing you at y

## (undated) NOTE — LETTER
AUTHORIZATION FOR SURGICAL OPERATION OR OTHER PROCEDURE    1. I hereby authorize Dr. Samm Hill, and [address] [place] [address] staff assigned to my case to perform the following operation and/or procedure at the [address] [place] [address]:      IUD Insertion     2. My physician has explained the nature and purpose of the operation or other procedure, possible alternative methods of treatment, the risks involved, and the possibility of complication to me. I acknowledge that no guarantee has been made as to the result that may be obtained. 3.  I recognize that, during the course of this operation, or other procedure, unforseen conditions may necessitate additional or different procedure than those listed above. I, therefore, further authorize and request that the above named physician, his/her physician assistants or designees perform such procedures as are, in his/her professional opinion, necessary and desirable. 4.  Any tissue or organs removed in the operation or other procedure may be disposed of by and at the discretion of the [address] [place] [address] and [facility]. 5.  I understand that in the event of a medical emergency, I will be transported by local paramedics to [facility] or other hospital emergency department. 6.  I certify that I have read and fully understand the above consent to operation and/or other procedure. 7.  I acknowledge that my physician has explained sedation/analgesia administration to me including the risks and benefits. I consent to the administration of sedation/analgesia as may be necessary or desirable in the judgement of my physician. Witness signature: ___________________________________________________ Date: 03/24/23                  Time:  ________ A. M.  P.M.        Patient Name:  ______________________________________________________  (please print)      Patient signature:  ___________________________________________________             Relationship to Patient:           []  Parent    Responsible person                          []  Spouse  In case of minor or                    [] Other  _____________   Incompetent name:  __________________________________________________                               (please print)      _____________      Responsible person  In case of minor or  Incompetent signature:  _______________________________________________    Statement of Physician  My signature below affirms that prior to the time of the procedure, I have explained to the patient and/or his/her guardian, the risks and benefits involved in the proposed treatment and any reasonable alternative to the proposed treatment. I have also explained the risks and benefits involved in the refusal of the proposed treatment and have answered the patient's questions.                         Date:  03/24/23  Provider                      Signature:  __________________________________________________________       Time:  ___________ REBECCA WOOD

## (undated) NOTE — LETTER
AUTHORIZATION FOR SURGICAL OPERATION OR OTHER PROCEDURE    1.  I hereby authorize Dr. Arnaldo Houston, and [facility] staff assigned to my case to perform the following operation and/or procedure at the [facility]:    ____________________________Nexpl ESTELA  P.M.        Patient Name:  ______________________________________________________  (please print)      Patient signature:  ___________________________________________________             Relationship to Patient:           []  Parent    Responsible per

## (undated) NOTE — LETTER
2/2/2021              Robyn Mariscal        [address]        [facility] [id number]         To Whom It May Concern,    The above named patient is currently under my care.  Please allow my patient to travel with her glucose meter as needed

## (undated) NOTE — LETTER
[address]      Authorization for Surgical Operation and Procedure     Date:_9/22/2020__________                                                                                                         Jasiel Villegas 4.   Should the need arise during my operation or immediate post-operative period, I also consent to the administration of blood and/or blood products.   Further, I understand that despite careful testing and screening of blood or blood products by kike 8.   I recognize that in the event my procedure results in extended X-Ray/fluoroscopy time, I may develop a skin reaction. 9.  If I have a Do Not Attempt Resuscitation (DNAR) order in place, that status will be suspended while in the operating room, proc STATEMENT OF PHYSICIAN My signature below affirms that prior to the time of the procedure; I have explained to the patient and/or his/her legal representative, the risks and benefits involved in the proposed treatment and any reasonable alternative to the